# Patient Record
Sex: FEMALE | Race: BLACK OR AFRICAN AMERICAN | NOT HISPANIC OR LATINO | Employment: OTHER | URBAN - METROPOLITAN AREA
[De-identification: names, ages, dates, MRNs, and addresses within clinical notes are randomized per-mention and may not be internally consistent; named-entity substitution may affect disease eponyms.]

---

## 2023-03-08 LAB
ALANINE AMINOTRANSFERASE (SGPT) (U/L) IN SER/PLAS: 20 U/L (ref 7–45)
ALBUMIN (G/DL) IN SER/PLAS: 4 G/DL (ref 3.4–5)
ALKALINE PHOSPHATASE (U/L) IN SER/PLAS: 115 U/L (ref 33–110)
ANION GAP IN SER/PLAS: 14 MMOL/L (ref 10–20)
ASPARTATE AMINOTRANSFERASE (SGOT) (U/L) IN SER/PLAS: 21 U/L (ref 9–39)
BILIRUBIN TOTAL (MG/DL) IN SER/PLAS: 0.4 MG/DL (ref 0–1.2)
CALCIUM (MG/DL) IN SER/PLAS: 9.5 MG/DL (ref 8.6–10.6)
CARBON DIOXIDE, TOTAL (MMOL/L) IN SER/PLAS: 32 MMOL/L (ref 21–32)
CHLORIDE (MMOL/L) IN SER/PLAS: 101 MMOL/L (ref 98–107)
CREATININE (MG/DL) IN SER/PLAS: 0.79 MG/DL (ref 0.5–1.05)
GFR FEMALE: 87 ML/MIN/1.73M2
GLUCOSE (MG/DL) IN SER/PLAS: 98 MG/DL (ref 74–99)
POTASSIUM (MMOL/L) IN SER/PLAS: 3.9 MMOL/L (ref 3.5–5.3)
PROTEIN TOTAL: 7.1 G/DL (ref 6.4–8.2)
SODIUM (MMOL/L) IN SER/PLAS: 143 MMOL/L (ref 136–145)
THYROTROPIN (MIU/L) IN SER/PLAS BY DETECTION LIMIT <= 0.05 MIU/L: 1.39 MIU/L (ref 0.44–3.98)
UREA NITROGEN (MG/DL) IN SER/PLAS: 12 MG/DL (ref 6–23)

## 2023-03-16 DIAGNOSIS — E11.9 TYPE 2 DIABETES MELLITUS WITHOUT COMPLICATION, WITHOUT LONG-TERM CURRENT USE OF INSULIN (MULTI): Primary | ICD-10-CM

## 2023-03-16 RX ORDER — DULAGLUTIDE 0.75 MG/.5ML
INJECTION, SOLUTION SUBCUTANEOUS
Qty: 6 ML | Refills: 2 | Status: SHIPPED | OUTPATIENT
Start: 2023-03-16 | End: 2023-12-14 | Stop reason: SDUPTHER

## 2023-03-20 LAB
CHOLESTEROL (MG/DL) IN SER/PLAS: 145 MG/DL (ref 0–199)
CHOLESTEROL IN HDL (MG/DL) IN SER/PLAS: 44.2 MG/DL
CHOLESTEROL/HDL RATIO: 3.3
LDL: 90 MG/DL (ref 0–99)
TRIGLYCERIDE (MG/DL) IN SER/PLAS: 55 MG/DL (ref 0–149)
VLDL: 11 MG/DL (ref 0–40)

## 2023-08-28 ENCOUNTER — TELEMEDICINE (OUTPATIENT)
Dept: PRIMARY CARE | Facility: CLINIC | Age: 58
End: 2023-08-28
Payer: MEDICARE

## 2023-08-28 VITALS — BODY MASS INDEX: 39.99 KG/M2 | WEIGHT: 270 LBS | HEIGHT: 69 IN

## 2023-08-28 DIAGNOSIS — R73.09 OTHER ABNORMAL GLUCOSE: ICD-10-CM

## 2023-08-28 DIAGNOSIS — M25.561 PAIN IN BOTH KNEES, UNSPECIFIED CHRONICITY: ICD-10-CM

## 2023-08-28 DIAGNOSIS — E03.9 ACQUIRED HYPOTHYROIDISM: Primary | ICD-10-CM

## 2023-08-28 DIAGNOSIS — M25.562 PAIN IN BOTH KNEES, UNSPECIFIED CHRONICITY: ICD-10-CM

## 2023-08-28 PROCEDURE — 99443 PR PHYS/QHP TELEPHONE EVALUATION 21-30 MIN: CPT | Performed by: FAMILY MEDICINE

## 2023-08-28 RX ORDER — ALBUTEROL SULFATE 90 UG/1
1 AEROSOL, METERED RESPIRATORY (INHALATION) EVERY 6 HOURS PRN
COMMUNITY
Start: 2023-05-04 | End: 2023-11-06

## 2023-08-28 RX ORDER — DICLOFENAC SODIUM 10 MG/G
4 GEL TOPICAL 4 TIMES DAILY
Qty: 100 G | Refills: 2 | Status: SHIPPED | OUTPATIENT
Start: 2023-08-28

## 2023-08-28 RX ORDER — LEVOTHYROXINE SODIUM 100 UG/1
100 TABLET ORAL
COMMUNITY
Start: 2021-10-25 | End: 2023-11-06 | Stop reason: DRUGHIGH

## 2023-08-28 RX ORDER — HYDROCHLOROTHIAZIDE 25 MG/1
25 TABLET ORAL DAILY
COMMUNITY
Start: 2021-07-22 | End: 2024-02-06 | Stop reason: SDUPTHER

## 2023-08-28 RX ORDER — ATORVASTATIN CALCIUM 20 MG/1
20 TABLET, FILM COATED ORAL DAILY
COMMUNITY
Start: 2022-06-27 | End: 2023-09-17

## 2023-08-28 ASSESSMENT — PAIN SCALES - GENERAL: PAINLEVEL: 0-NO PAIN

## 2023-08-28 NOTE — PROGRESS NOTES
"  Subjective   Chief complaint: Bisi Schwarz is a 57 y.o. female who presents for Follow-up.    HPI:  Phone visit only for follow-up.  Patient reports that she has been having bilateral knee pain, on and off, associated with some swelling.  Does have a history of arthritis in her back, pain is not interfering with walking.  Has been compliant with her medications and does not currently need refills.  Medication reviewed and reconciled with patient.  She also was here to review recent blood work.        Objective   Ht 1.753 m (5' 9\")   Wt 122 kg (270 lb)   BMI 39.87 kg/m²       PHYSICAL EXAMINATION:  GENERAL: Alert  HEENT: unable to assess  NECK: unable to assess  CHEST: Non labored breathing  EXTREMITIES: unable to assess  NEUROLOGIC: Deferred       I have reviewed and reconciled the medication list with the patient today.   Current Outpatient Medications:     atorvastatin (Lipitor) 20 mg tablet, Take 1 tablet (20 mg) by mouth once daily., Disp: , Rfl:     hydroCHLOROthiazide (HYDRODiuril) 25 mg tablet, Take 1 tablet (25 mg) by mouth once daily., Disp: , Rfl:     levothyroxine (Synthroid, Levoxyl) 100 mcg tablet, Take 1 tablet (100 mcg) by mouth once daily in the morning. Take before meals., Disp: , Rfl:     Ventolin HFA 90 mcg/actuation inhaler, Inhale 1 puff every 6 hours if needed., Disp: , Rfl:     amitriptyline (Elavil) 25 mg tablet, , Disp: , Rfl:     diclofenac sodium (Voltaren) 1 % gel gel, Apply 1 Application topically 4 times a day., Disp: 100 g, Rfl: 2    Trulicity 0.75 mg/0.5 mL pen injector, INJECT ONCE WEEKLY SUBCUTANEOUSLY, Disp: 6 mL, Rfl: 2     Imaging:  No results found.     Labs reviewed:    Lab Results   Component Value Date    WBC 6.4 12/05/2021    HGB 13.3 12/05/2021    HCT 42.1 12/05/2021     12/05/2021    CHOL 145 03/20/2023    TRIG 55 03/20/2023    HDL 44.2 03/20/2023    ALT 20 03/08/2023    AST 21 03/08/2023     03/08/2023    K 3.9 03/08/2023     03/08/2023    " CREATININE 0.79 03/08/2023    BUN 12 03/08/2023    CO2 32 03/08/2023    TSH 1.39 03/08/2023    INR 1.0 12/05/2021    HGBA1C 5.7 (A) 05/25/2022         Assessment/Plan   Diagnoses and all orders for this visit:  Acquired hypothyroidism  -     Hemoglobin A1C; Future  -     Tsh With Reflex To Free T4 If Abnormal; Future  Other abnormal glucose  -     Hemoglobin A1C; Future  Pain in both knees, unspecified chronicity  -     diclofenac sodium (Voltaren) 1 % gel gel; Apply 1 Application topically 4 times a day.   Diagnosis and Management discussed with the patient.  Patient agreeable with plan.  Patient advised to Return to clinic with new or unresolved symptoms.  Doc Vasquez MD    This note was partially generated using the Dragon Voice Recognition System and there may be some incorrect words or wording, spelling and /or spelling errors, or punctuation errors that were not corrected prior to committing the note to the medical record.

## 2023-09-13 DIAGNOSIS — R73.09 OTHER ABNORMAL GLUCOSE: Primary | ICD-10-CM

## 2023-09-17 RX ORDER — ATORVASTATIN CALCIUM 20 MG/1
TABLET, FILM COATED ORAL
Qty: 90 TABLET | Refills: 6 | Status: SHIPPED | OUTPATIENT
Start: 2023-09-17 | End: 2024-02-06 | Stop reason: SDUPTHER

## 2023-11-03 ENCOUNTER — LAB (OUTPATIENT)
Dept: LAB | Facility: LAB | Age: 58
End: 2023-11-03
Payer: MEDICARE

## 2023-11-03 DIAGNOSIS — E03.9 ACQUIRED HYPOTHYROIDISM: ICD-10-CM

## 2023-11-03 LAB
T4 FREE SERPL-MCNC: 0.99 NG/DL (ref 0.78–1.48)
TSH SERPL-ACNC: 6.46 MIU/L (ref 0.44–3.98)

## 2023-11-03 PROCEDURE — 84443 ASSAY THYROID STIM HORMONE: CPT

## 2023-11-03 PROCEDURE — 84439 ASSAY OF FREE THYROXINE: CPT

## 2023-11-03 PROCEDURE — 36415 COLL VENOUS BLD VENIPUNCTURE: CPT

## 2023-11-06 ENCOUNTER — OFFICE VISIT (OUTPATIENT)
Dept: PRIMARY CARE | Facility: CLINIC | Age: 58
End: 2023-11-06
Payer: MEDICARE

## 2023-11-06 VITALS
HEIGHT: 69 IN | BODY MASS INDEX: 41.83 KG/M2 | TEMPERATURE: 96.9 F | HEART RATE: 78 BPM | OXYGEN SATURATION: 96 % | DIASTOLIC BLOOD PRESSURE: 74 MMHG | WEIGHT: 282.4 LBS | SYSTOLIC BLOOD PRESSURE: 114 MMHG

## 2023-11-06 DIAGNOSIS — E03.8 OTHER SPECIFIED HYPOTHYROIDISM: Primary | ICD-10-CM

## 2023-11-06 DIAGNOSIS — E66.01 OBESITY, MORBID (MULTI): ICD-10-CM

## 2023-11-06 DIAGNOSIS — J45.20 MILD INTERMITTENT ASTHMA WITHOUT COMPLICATION (HHS-HCC): ICD-10-CM

## 2023-11-06 DIAGNOSIS — M54.40 LOW BACK PAIN WITH SCIATICA, SCIATICA LATERALITY UNSPECIFIED, UNSPECIFIED BACK PAIN LATERALITY, UNSPECIFIED CHRONICITY: ICD-10-CM

## 2023-11-06 PROBLEM — H02.88A MEIBOMIAN GLAND DYSFUNCTION (MGD) OF UPPER AND LOWER EYELID OF RIGHT EYE: Status: ACTIVE | Noted: 2023-11-06

## 2023-11-06 PROBLEM — G43.909 MIGRAINE HEADACHE: Status: ACTIVE | Noted: 2023-09-05

## 2023-11-06 PROBLEM — F20.9 SCHIZOPHRENIA, UNSPECIFIED (MULTI): Chronic | Status: ACTIVE | Noted: 2021-06-01

## 2023-11-06 PROBLEM — F31.10 BIPOLAR DISORDER, CURRENT EPISODE MANIC WITHOUT PSYCHOTIC FEATURES (MULTI): Status: ACTIVE | Noted: 2023-11-06

## 2023-11-06 PROBLEM — H10.13 ALLERGIC CONJUNCTIVITIS OF BOTH EYES: Status: ACTIVE | Noted: 2023-11-06

## 2023-11-06 PROBLEM — E03.9 HYPOTHYROIDISM: Status: ACTIVE | Noted: 2023-11-01

## 2023-11-06 PROBLEM — R93.89 ABNORMAL MRI: Status: ACTIVE | Noted: 2023-11-06

## 2023-11-06 PROBLEM — M25.60 MORNING STIFFNESS OF JOINTS: Status: ACTIVE | Noted: 2023-09-05

## 2023-11-06 PROBLEM — E11.9 DIABETES MELLITUS (MULTI): Status: ACTIVE | Noted: 2023-11-06

## 2023-11-06 PROBLEM — R10.2 PELVIC PAIN IN FEMALE: Status: ACTIVE | Noted: 2023-03-23

## 2023-11-06 PROBLEM — E66.812 OBESITY, CLASS II, BMI 35-39.9: Status: ACTIVE | Noted: 2018-12-13

## 2023-11-06 PROBLEM — H02.88B MEIBOMIAN GLAND DYSFUNCTION (MGD) OF UPPER AND LOWER EYELID OF LEFT EYE: Status: ACTIVE | Noted: 2023-11-06

## 2023-11-06 PROBLEM — N60.19 FIBROCYSTIC BREAST CHANGES: Status: ACTIVE | Noted: 2023-09-05

## 2023-11-06 PROBLEM — R42 VERTIGO: Status: ACTIVE | Noted: 2023-11-06

## 2023-11-06 PROBLEM — R92.30 DENSE BREASTS: Status: ACTIVE | Noted: 2023-11-06

## 2023-11-06 PROBLEM — R92.2 DENSE BREASTS: Status: ACTIVE | Noted: 2023-11-06

## 2023-11-06 PROBLEM — D24.9 FIBROADENOMA OF BREAST: Status: ACTIVE | Noted: 2023-09-05

## 2023-11-06 PROBLEM — H52.31 ANISOMETROPIA: Status: ACTIVE | Noted: 2023-11-06

## 2023-11-06 PROBLEM — E11.9 TYPE 2 DIABETES MELLITUS WITHOUT COMPLICATIONS (MULTI): Status: ACTIVE | Noted: 2023-11-06

## 2023-11-06 PROBLEM — R73.03 PREDIABETES: Status: ACTIVE | Noted: 2023-08-24

## 2023-11-06 PROBLEM — R92.8 ABNORMAL ULTRASOUND OF BREAST: Status: ACTIVE | Noted: 2023-11-06

## 2023-11-06 PROBLEM — J90 PLEURAL EFFUSION, BILATERAL: Status: ACTIVE | Noted: 2023-11-06

## 2023-11-06 PROBLEM — I10 HYPERTENSION: Status: ACTIVE | Noted: 2023-11-01

## 2023-11-06 PROBLEM — D12.6 TUBULAR ADENOMA OF COLON: Status: ACTIVE | Noted: 2023-11-06

## 2023-11-06 PROBLEM — H52.13 MYOPIA WITH PRESBYOPIA OF BOTH EYES: Status: ACTIVE | Noted: 2023-11-06

## 2023-11-06 PROBLEM — N63.0 MASS OF BREAST: Status: ACTIVE | Noted: 2023-09-05

## 2023-11-06 PROBLEM — R29.6 FREQUENT FALLS: Status: ACTIVE | Noted: 2023-11-06

## 2023-11-06 PROBLEM — D32.9 MENINGIOMA (MULTI): Status: ACTIVE | Noted: 2023-11-06

## 2023-11-06 PROBLEM — M54.50 LOW BACK PAIN: Status: ACTIVE | Noted: 2023-11-06

## 2023-11-06 PROBLEM — R16.1 SPLENIC MASS: Status: ACTIVE | Noted: 2023-11-06

## 2023-11-06 PROBLEM — E78.5 HYPERLIPIDEMIA: Status: ACTIVE | Noted: 2023-11-01

## 2023-11-06 PROBLEM — H52.4 MYOPIA WITH PRESBYOPIA OF BOTH EYES: Status: ACTIVE | Noted: 2023-11-06

## 2023-11-06 PROBLEM — R92.8 ABNORMAL MAMMOGRAM: Status: ACTIVE | Noted: 2023-11-06

## 2023-11-06 PROBLEM — H52.03 HYPERMETROPIA OF BOTH EYES: Status: ACTIVE | Noted: 2023-11-06

## 2023-11-06 PROBLEM — E66.9 OBESITY, CLASS II, BMI 35-39.9: Status: ACTIVE | Noted: 2018-12-13

## 2023-11-06 PROBLEM — J45.909 ASTHMA (HHS-HCC): Status: ACTIVE | Noted: 2023-11-06

## 2023-11-06 PROCEDURE — 3074F SYST BP LT 130 MM HG: CPT | Performed by: FAMILY MEDICINE

## 2023-11-06 PROCEDURE — 99214 OFFICE O/P EST MOD 30 MIN: CPT | Performed by: FAMILY MEDICINE

## 2023-11-06 PROCEDURE — 3078F DIAST BP <80 MM HG: CPT | Performed by: FAMILY MEDICINE

## 2023-11-06 PROCEDURE — 1036F TOBACCO NON-USER: CPT | Performed by: FAMILY MEDICINE

## 2023-11-06 RX ORDER — LEVOTHYROXINE SODIUM 112 UG/1
112 TABLET ORAL DAILY
Qty: 30 TABLET | Refills: 11 | Status: SHIPPED | OUTPATIENT
Start: 2023-11-06 | End: 2024-02-06 | Stop reason: SDUPTHER

## 2023-11-06 RX ORDER — FERROUS SULFATE, DRIED 160(50) MG
1 TABLET, EXTENDED RELEASE ORAL DAILY
Qty: 30 TABLET | Refills: 3 | Status: SHIPPED | OUTPATIENT
Start: 2023-11-06 | End: 2024-03-29 | Stop reason: SDUPTHER

## 2023-11-06 RX ORDER — ALBUTEROL SULFATE 90 UG/1
2 AEROSOL, METERED RESPIRATORY (INHALATION) EVERY 4 HOURS PRN
Qty: 6.7 G | Refills: 5 | Status: SHIPPED | OUTPATIENT
Start: 2023-11-06 | End: 2024-11-05

## 2023-11-06 ASSESSMENT — ENCOUNTER SYMPTOMS: DEPRESSION: 0

## 2023-11-06 ASSESSMENT — PAIN SCALES - GENERAL: PAINLEVEL: 6

## 2023-11-06 NOTE — PROGRESS NOTES
Subjective   Patient ID: Bisi Schwarz is a 57 y.o. female with PMH of HTN, HLD, T2DM, and hypothyroidism who presents for follow up visit.    HPI  Reports no acute concerns since last visit in August 2023. Had complaints of knee pain since at telehealth visit, but no issues with knee pain today. Patient does complain of fatigue, hip/back pain, and weight gain, although these are chronic issues.    Fatigue  -reports fatigue has been going on for past 2-3 years.  On and off, resolves throughout the day.    Hip/back pain  -been going on for 10 years now  -has worsened over the past 2-3 years  -hx of arthritis, taking nothing for it  Affects her ability to work out    Weight gain  -gained 12 pounds this year  -on trulicity for weight loss and diabetes  -diet: reported good diet, lots of fruits and vegetables  -exercise: none since hip/back pain worsened 2-3 years ago    Health maintenance  -mammogram in 2023: no abnormal findings  -pap: patient had hysterectomy  -colonoscopy in 2022: no abnormal findings (come back in 3 years) -> due in 2025  -received flu shot and covid shot this fall  -received shingrix in 2023  -no need for pneumococcal at this time    PMH: HLD, HTN, T2DM, hypothyroidism     Current Outpatient Medications   Medication Instructions         atorvastatin (Lipitor) 20 mg tablet TAKE 1 TABLET BY MOUTH EVERY DAY EVERY DAY    diclofenac sodium (Voltaren) 1 % gel gel 1 Application, Topical, 4 times daily    hydroCHLOROthiazide (HYDRODIURIL) 25 mg, oral, Daily    levothyroxine (SYNTHROID, LEVOXYL) 100 mcg, oral, Daily before breakfast    Trulicity 0.75 mg/0.5 mL pen injector INJECT ONCE WEEKLY SUBCUTANEOUSLY    Ventolin HFA 90 mcg/actuation inhaler 1 puff, inhalation, Every 6 hours PRN      Past Surgical History:   Procedure Laterality Date    BREAST BIOPSY  03/18/2014    Biopsy Breast Open    TOTAL ABDOMINAL HYSTERECTOMY  03/18/2014    Total Abdominal Hysterectomy    VARICOSE VEIN SURGERY  09/29/2014     "Varicose Vein Ligation      Allergies   Allergen Reactions    Penicillins Dizziness and Hives     States \"falls out\"    Other reaction(s): Dizziness, Mental Status Change   States \"falls out\"    Pork Derived (Porcine) Headache    Sulfa (Sulfonamide Antibiotics) Diarrhea and Rash      Social History     Tobacco Use    Smoking status: Never    Smokeless tobacco: Never   Substance Use Topics    Alcohol use: Never    Drug use: Never      Family hx:  -Diabetes in father and both grandparents  -HTN and heart disease - father and both grandparents  -Cancer - unsure what type    Review of Systems  Denies fever, chills, cough, shortness of breath  Endorses heat intolerance, night sweats, fatigue, heart palpitations, tremor  Reports snoring    Objective   Vitals: /74 (BP Location: Right arm, Patient Position: Sitting)   Pulse 78   Temp 36.1 °C (96.9 °F) (Temporal)   Ht 1.753 m (5' 9\")   Wt 128 kg (282 lb 6.4 oz)   SpO2 96%   BMI 41.70 kg/m²      Physical Exam  GEN: fatigued and tired appearing, in no acute distress  HEAD: NCAT  EYES: sclera clear and anicteric  CARDIO: RRR, no M/R/G appreciated  RESP: CTAB, no increased WOB  ABD: soft, nontender, no organomegaly  EXT: trace nonpitting edema in bilateral ankles  SKIN: no rashes or lesions  NEURO: cranial nerves II-XII grossly intact      Assessment/Plan   Bisi Schwarz is a 57 y.o. female with PMH of HTN, HLD, T2DM, and hypothyroidism who presents for follow up visit.    #Fatigue  -reports fatigue has been going on for past 2-3 years  -given BMI and reported snoring, could consider YVONNE as possible cause of fatigue, although blood pressure is normotensive (114/74)  -patient has hypothyroidism and last TSH was 6.46 on 11/3/2023  -given elevated TSH, most likely cause of fatigue is inadequate levothyroxine dose -> increase levothyroxine to 112 mcg  -can consider sleep study in future to evaluate for YVONNE if fatigue continues    Hip/back pain  -been going on for 10 " "years now  -has worsened over the past 2-3 years  -hx of arthritis  -referred to physical therapy for management of pain    Weight gain  -gained 12 pounds this year  -on trulicity for weight loss and diabetes  -diet: reported good diet, lots of fruits and vegetables  -exercise: none since hip/back pain worsened 2-3 years ago  -encouraged exercise and referred to physical therapy    Health maintenance  -mammogram in 2023: no abnormal findings -> due in 2025  -pap: patient had hysterectomy  -colonoscopy in 2022: no abnormal findings (come back in 3 years because \"bowel preparation was suboptimal and for surveillance) -> due in 2025  -received flu shot and covid shot this fall  -received shingrix in 2023  -no need for pneumococcal at this time        RTC in 6 months, or earlier as needed.  Attending Supervision: seen and discussed with attending physician (sergio listed on this note).      Wiliam Mills, MS3      I, or a resident under my supervision, was present with the medical student who participated in the documentation of this note.  I have personally seen and examined the patient and performed the medical decision-making components. I have reviewed the medical student documentation and/or resident documentation and verified the findings in the note as written with additions or exceptions as stated in the body of the note.  Diagnoses and all orders for this visit:  Other specified hypothyroidism  -     levothyroxine (Synthroid, Levoxyl) 112 mcg tablet; Take 1 tablet (112 mcg) by mouth once daily.  -     calcium carbonate-vitamin D3 (Oyster Shell Calcium-Vit D3) 500 mg-5 mcg (200 unit) tablet; Take 1 tablet by mouth once daily.  Mild intermittent asthma without complication  -     albuterol (Proventil HFA) 90 mcg/actuation inhaler; Inhale 2 puffs every 4 hours if needed for wheezing or shortness of breath.  Low back pain with sciatica, sciatica laterality unspecified, unspecified back pain laterality, " unspecified chronicity  -     Referral to Physical Therapy; Future    Doc Vasquez MD

## 2023-11-27 ENCOUNTER — EVALUATION (OUTPATIENT)
Dept: PHYSICAL THERAPY | Facility: CLINIC | Age: 58
End: 2023-11-27
Payer: MEDICARE

## 2023-11-27 DIAGNOSIS — M54.40 LOW BACK PAIN WITH SCIATICA, SCIATICA LATERALITY UNSPECIFIED, UNSPECIFIED BACK PAIN LATERALITY, UNSPECIFIED CHRONICITY: Primary | ICD-10-CM

## 2023-11-27 PROCEDURE — 97161 PT EVAL LOW COMPLEX 20 MIN: CPT | Mod: GP

## 2023-11-27 PROCEDURE — 97110 THERAPEUTIC EXERCISES: CPT | Mod: GP

## 2023-11-27 ASSESSMENT — ENCOUNTER SYMPTOMS
OCCASIONAL FEELINGS OF UNSTEADINESS: 0
DEPRESSION: 0

## 2023-11-27 NOTE — PROGRESS NOTES
Physical Therapy    Physical Therapy Evaluation and Treatment      Patient Name: Bisi Schwarz  MRN: [id number]  Today's Date: 11/27/2023  Time Calculation  Start Time: 1310  Stop Time: 1345  Time Calculation (min): 35 min  Visit: 1    Assessment:  PT Assessment  Assessment Comment: Patient presents with signs and symptoms consistent with the medical diagnosis of sciatica. She will benefit from medically necessary skilled physical therapy interventions in order to decrease pain and improve function with daily activities.     Plan:  OP PT Plan  Treatment/Interventions: Cryotherapy, Dry needling, Education/ Instruction, Electrical stimulation, Hot pack, Mechanical traction, Neuromuscular re-education, Therapeutic activities, Therapeutic exercises  PT Plan: Skilled PT  PT Frequency: 1 time per week  Duration: 8 weeks  Certification Period Start Date: 11/27/23  Certification Period End Date: 02/25/24  Rehab Potential: Excellent  Plan of Care Agreement: Patient    Current Problem:   1. Low back pain with sciatica, sciatica laterality unspecified, unspecified back pain laterality, unspecified chronicity  Referral to Physical Therapy    Follow Up In Physical Therapy          Subjective    General:  General  Reason for Referral: low back pain  Referred By: Doc Vasquez MD  Preferred Learning Style: auditory  General Comment: Patient is a 56 y/o female who was referred to outpatient physical therapy due to bilateral sciatica. She notes pain starts in her low back and radiates to bilateral hips. Patient reports this has been going on for about 5 years. The patient was walking one day when she felt the symptoms come on. She report no trauma or injury associated with the onset of pain. She rates her pain today at a 3/10. The patient has been walking for exercise for the past month. She report taking ibuprofen and Tylenol as needed. The patient reports that if she walk for more than an hour the knees will swell  "up.  Precautions:   None   Pain:   3/10  Prior Level of Function:   Independent with all ADLs.    Objective   Cognition:     General Assessments:  Posture Comment: Patient presents with slightly forward flexed posture while seated.    Range of Motion Comments: Lumbar ROM:  Flexion 75%  Extension 25%  Right Rotation 75%  Left Rotation 75%  Right Side Bend 50%  Left Side Bend 50%    Strength Comments: LE strength (R/L):  Hip flexion 4/5, 4/5  Hip extension 4/5, 4/5  Hip abduction 4/5, 4/5  Knee flexion 5/5, 5/5  Knee extension 5/5, 5/5  Ankle plantarflexion 5/5, 5/5  Ankle dorsiflexion 5/5, 5/5  Core: 3/5    Palpation Comment: The patient's lower lumbar paraspinal musculature is tender to palpation.   Special Tests Comment: slump test positive bilaterally, SLR positive left, negative right  Functional Assessments:  Gait Comment: Patient ambulates with a normalized gait pattern.    Outcome Measures:  MEENA: 52% Disability     Treatments:  Therapeutic Exercise  Therapeutic Exercise Performed: Yes  Therapeutic Exercise Activity 1: LTR 10 x 5\"  Therapeutic Exercise Activity 2: open book 10 x 10\"  Therapeutic Exercise Activity 3: piriformis stretch 3 x 30\"  Therapeutic Exercise Activity 4: supine PPT 20 x 5\"  Therapeutic Exercise Activity 5: bridge 2 x 10 x 3\"    Goals:  Active       PT Problem       Patient will report compliance with her home exercise program.        Start:  11/27/23    Expected End:  02/25/24            Patient will report improvement via the MEENA to show improvements in activity tolerance.        Start:  11/27/23    Expected End:  02/25/24            Patient will demonstrate improvements in core and hip strength to 5/5 to improve lumbopelvic stability with daily activities.        Start:  11/27/23    Expected End:  02/25/24                      "

## 2023-12-05 ENCOUNTER — TREATMENT (OUTPATIENT)
Dept: PHYSICAL THERAPY | Facility: CLINIC | Age: 58
End: 2023-12-05
Payer: MEDICARE

## 2023-12-05 DIAGNOSIS — M54.40 ACUTE RIGHT-SIDED LOW BACK PAIN WITH SCIATICA, SCIATICA LATERALITY UNSPECIFIED: Primary | ICD-10-CM

## 2023-12-05 DIAGNOSIS — M54.40 LOW BACK PAIN WITH SCIATICA, SCIATICA LATERALITY UNSPECIFIED, UNSPECIFIED BACK PAIN LATERALITY, UNSPECIFIED CHRONICITY: ICD-10-CM

## 2023-12-05 PROCEDURE — 97110 THERAPEUTIC EXERCISES: CPT | Mod: GP,CQ | Performed by: PHYSICAL THERAPY ASSISTANT

## 2023-12-05 NOTE — PROGRESS NOTES
"Physical Therapy    Physical Therapy Treatment    Patient Name: Bisi Schwarz  MRN: [id number]  Today's Date: 12/5/2023  Time Calculation  Start Time: 1155  Stop Time: 1240  Time Calculation (min): 45 min  Visit 2    Assessment:  Good return demo of HEP except she forgot bridges. Able to perform bridges today w/o c/o pain. Struggles with squats d/t her knees and limited ankle ROM. Squats were better after DF step mobs and lifting form was better.     Plan:  Review lifting . General LE and core strength. ( Tall knee KB) 4 way hip .     Current Problem  1. Acute right-sided low back pain with sciatica, sciatica laterality unspecified        2. Low back pain with sciatica, sciatica laterality unspecified, unspecified back pain laterality, unspecified chronicity  Follow Up In Physical Therapy            Subjective    Compliant w/ HEP. Had some back strain after moving some books while volunteering at the [employer]. Strain resolved after 2 days.     Objective   Lifts with knees extended and back flexed    Treatments:  Therapeutic Exercise  Therapeutic Exercise Performed: Yes  Therapeutic Exercise Activity 1: LTR 10 x 5\"  Therapeutic Exercise Activity 2: open book 10 x 10\"  Therapeutic Exercise Activity 3: piriformis stretch 3 x 30\"  Therapeutic Exercise Activity 4: supine PPT 20 x 5\"  Therapeutic Exercise Activity 5: bridge 2 x 10 x 3\"  Therapeutic Exercise Activity 6: Lifitng mechanics: get close, squat, lift in one motion w/ head up      Goals:  Active       PT Problem       Patient will report compliance with her home exercise program.        Start:  11/27/23    Expected End:  02/25/24            Patient will report improvement via the MEENA to show improvements in activity tolerance.        Start:  11/27/23    Expected End:  02/25/24            Patient will demonstrate improvements in core and hip strength to 5/5 to improve lumbopelvic stability with daily activities.        Start:  11/27/23    Expected End:  02/25/24 "

## 2023-12-14 ENCOUNTER — TREATMENT (OUTPATIENT)
Dept: PHYSICAL THERAPY | Facility: CLINIC | Age: 58
End: 2023-12-14
Payer: MEDICARE

## 2023-12-14 DIAGNOSIS — M54.40 ACUTE RIGHT-SIDED LOW BACK PAIN WITH SCIATICA, SCIATICA LATERALITY UNSPECIFIED: Primary | ICD-10-CM

## 2023-12-14 DIAGNOSIS — E11.9 TYPE 2 DIABETES MELLITUS WITHOUT COMPLICATION, WITHOUT LONG-TERM CURRENT USE OF INSULIN (MULTI): ICD-10-CM

## 2023-12-14 PROCEDURE — 97110 THERAPEUTIC EXERCISES: CPT | Mod: GP,CQ | Performed by: PHYSICAL THERAPY ASSISTANT

## 2023-12-14 RX ORDER — DULAGLUTIDE 0.75 MG/.5ML
INJECTION, SOLUTION SUBCUTANEOUS
Qty: 6 ML | Refills: 2 | Status: SHIPPED | OUTPATIENT
Start: 2023-12-14 | End: 2024-02-06 | Stop reason: SDUPTHER

## 2023-12-14 NOTE — PROGRESS NOTES
"Physical Therapy    Physical Therapy Treatment    Patient Name: Bisi Schwarz  MRN: [id number]  Today's Date: 12/14/2023  Time Calculation  Start Time: 1045  Stop Time: 1135  Time Calculation (min): 50 min  Visit 3    Assessment:  Did well with seated versions of trunk rotation and standing trunk extension for easy application after strenuous activity .     Plan:   Farmer's carry .    Current Problem  1. Acute right-sided low back pain with sciatica, sciatica laterality unspecified              Subjective    C/o T/S \"spasms \" after house work yesterday. Did not try HEP ex or stretches to relieve spasms.     Objective   Sits with slightly slouched posture ; corrects with cues     Treatments:  Therapeutic Exercise  Therapeutic Exercise Performed: Yes  Therapeutic Exercise Activity 1: LTR 10 x 5\"  Therapeutic Exercise Activity 2: open book 10 x 10\"  Therapeutic Exercise Activity 3: piriformis stretch 3 x 30\"  Therapeutic Exercise Activity 4: supine PPT 20 x 5\"  Therapeutic Exercise Activity 5: bridge 2 x 10 x 3\"  Therapeutic Exercise Activity 6: Leg press #80 x 20 #40 heel raises x 20  Therapeutic Exercise Activity 7: seated trunk rotation x 5 reps R/L  Therapeutic Exercise Activity 8: standing trunk extension x 10 reps  Therapeutic Exercise Activity 9: Seated piriformis stretch 30 sec x 2 L/R  Therapeutic Exercise Activity 10: Bike x 5 min      Goals:  Active       PT Problem       Patient will report compliance with her home exercise program.        Start:  11/27/23    Expected End:  02/25/24            Patient will report improvement via the MEENA to show improvements in activity tolerance.        Start:  11/27/23    Expected End:  02/25/24            Patient will demonstrate improvements in core and hip strength to 5/5 to improve lumbopelvic stability with daily activities.        Start:  11/27/23    Expected End:  02/25/24              " 2 = assistive person

## 2023-12-27 ENCOUNTER — TREATMENT (OUTPATIENT)
Dept: PHYSICAL THERAPY | Facility: CLINIC | Age: 58
End: 2023-12-27
Payer: MEDICARE

## 2023-12-27 DIAGNOSIS — M54.40 ACUTE RIGHT-SIDED LOW BACK PAIN WITH SCIATICA, SCIATICA LATERALITY UNSPECIFIED: Primary | ICD-10-CM

## 2023-12-27 PROCEDURE — 97110 THERAPEUTIC EXERCISES: CPT | Mod: GP,CQ | Performed by: PHYSICAL THERAPY ASSISTANT

## 2023-12-27 NOTE — PROGRESS NOTES
"Physical Therapy    Physical Therapy Treatment    Patient Name: Bisi Schwarz  MRN: [id number]  Today's Date: 12/27/2023  Time Calculation  Start Time: 1020  Stop Time: 1110  Time Calculation (min): 50 min  Visit 4    Assessment:  Alternatives to bridging ( sit/stands, wall slide) bothered her L knee, so encouraged her to continue w/ bridges .     Plan:   Farmer's carry , hip to heel stretch, hamstring stretch, 4 way hip w/ band.     Current Problem  1. Acute right-sided low back pain with sciatica, sciatica laterality unspecified              Subjective    Had brief back pain yesterday while shopping .    Objective   More neck extension vs. Trunk extension during back bends     Treatments:  Therapeutic Exercise  Therapeutic Exercise Performed: Yes  Therapeutic Exercise Activity 1: LTR 10 x 5\"  Therapeutic Exercise Activity 2: open book 10 x 10\"  Therapeutic Exercise Activity 3: piriformis stretch 3 x 30\"  Therapeutic Exercise Activity 4: supine PPT 20 x 5\"  Therapeutic Exercise Activity 5: bridge 2 x 10 x 3\"  Therapeutic Exercise Activity 6: Leg press #80 x 20 #40 heel raises x 20  Therapeutic Exercise Activity 7: seated trunk rotation x 5 reps R/L  Therapeutic Exercise Activity 8: standing trunk extension x 10 reps  Therapeutic Exercise Activity 10: Bike x 5 min  Therapeutic Exercise Activity 11: heel riases 2 x 10  Therapeutic Exercise Activity 12: 6\" step ups fwd 2 x 10 R/L      Goals:  Active       PT Problem       Patient will report compliance with her home exercise program.        Start:  11/27/23    Expected End:  02/25/24            Patient will report improvement via the MEENA to show improvements in activity tolerance.        Start:  11/27/23    Expected End:  02/25/24            Patient will demonstrate improvements in core and hip strength to 5/5 to improve lumbopelvic stability with daily activities.        Start:  11/27/23    Expected End:  02/25/24              "

## 2024-01-03 ENCOUNTER — TREATMENT (OUTPATIENT)
Dept: PHYSICAL THERAPY | Facility: CLINIC | Age: 59
End: 2024-01-03
Payer: MEDICARE

## 2024-01-03 DIAGNOSIS — M54.40 ACUTE RIGHT-SIDED LOW BACK PAIN WITH SCIATICA, SCIATICA LATERALITY UNSPECIFIED: Primary | ICD-10-CM

## 2024-01-03 PROCEDURE — 97110 THERAPEUTIC EXERCISES: CPT | Mod: GP,CQ | Performed by: PHYSICAL THERAPY ASSISTANT

## 2024-01-03 NOTE — PROGRESS NOTES
Physical Therapy    Physical Therapy Treatment    Patient Name: Bisi Schwarz  MRN: [id number]  Today's Date: 1/3/2024  [id number]     Visit 4    Assessment:  Challenged w/ farmer's carry w/ cues to engage core avoiding trunk side bend.     Plan:  Hip to heel stretch, hamstring stretch,     Current Problem    Acute right-sided low back pain with sciatica, sciatica laterality unspecified   Subjective    Doing well , intermittent LBP w/o radicular symptoms . C/o R knee pain .     Objective   Amb w/o antalgia     Treatments:  Therapeutic Exercise  Therapeutic Exercise Performed: Yes  Therapeutic Exercise Activity 1: back extension #60 3 x 10  Therapeutic Exercise Activity 2: HSC #33 3 x 10  Therapeutic Exercise Activity 3: KE #11 3 x 10  Therapeutic Exercise Activity 4: 4 way hip #10 x 10 R/L  Therapeutic Exercise Activity 5: bike x 5 min  Therapeutic Exercise Activity 6: Leg press #80 x 20 #40 heel raises x 20  Therapeutic Exercise Activity 7: Farmer's carry #7.5 KB x 4 laps in clinic      Goals:  Active       PT Problem       Patient will report compliance with her home exercise program.        Start:  11/27/23    Expected End:  02/25/24            Patient will report improvement via the MEENA to show improvements in activity tolerance.        Start:  11/27/23    Expected End:  02/25/24            Patient will demonstrate improvements in core and hip strength to 5/5 to improve lumbopelvic stability with daily activities.        Start:  11/27/23    Expected End:  02/25/24

## 2024-01-05 ENCOUNTER — APPOINTMENT (OUTPATIENT)
Dept: OPHTHALMOLOGY | Facility: CLINIC | Age: 59
End: 2024-01-05
Payer: MEDICARE

## 2024-01-17 ENCOUNTER — APPOINTMENT (OUTPATIENT)
Dept: PHYSICAL THERAPY | Facility: CLINIC | Age: 59
End: 2024-01-17
Payer: MEDICARE

## 2024-01-29 ENCOUNTER — DOCUMENTATION (OUTPATIENT)
Dept: PHYSICAL THERAPY | Facility: CLINIC | Age: 59
End: 2024-01-29
Payer: MEDICARE

## 2024-01-29 ENCOUNTER — APPOINTMENT (OUTPATIENT)
Dept: PHYSICAL THERAPY | Facility: CLINIC | Age: 59
End: 2024-01-29
Payer: MEDICARE

## 2024-01-29 NOTE — PROGRESS NOTES
Physical Therapy                 Therapy Communication Note    Patient Name: Bisi Schwarz  MRN: [id number]  Today's Date: 1/29/2024     Discipline: Physical Therapy    Missed Visit Reason:      Missed Time: Cancel    Comment:

## 2024-01-31 ENCOUNTER — APPOINTMENT (OUTPATIENT)
Dept: NEUROLOGY | Facility: CLINIC | Age: 59
End: 2024-01-31
Payer: MEDICARE

## 2024-02-02 ENCOUNTER — APPOINTMENT (OUTPATIENT)
Dept: PRIMARY CARE | Facility: CLINIC | Age: 59
End: 2024-02-02
Payer: MEDICARE

## 2024-02-06 ENCOUNTER — OFFICE VISIT (OUTPATIENT)
Dept: PRIMARY CARE | Facility: CLINIC | Age: 59
End: 2024-02-06
Payer: MEDICARE

## 2024-02-06 VITALS
HEART RATE: 87 BPM | OXYGEN SATURATION: 98 % | HEIGHT: 69 IN | DIASTOLIC BLOOD PRESSURE: 78 MMHG | TEMPERATURE: 97.1 F | SYSTOLIC BLOOD PRESSURE: 129 MMHG | BODY MASS INDEX: 40.69 KG/M2 | WEIGHT: 274.7 LBS

## 2024-02-06 DIAGNOSIS — E03.8 OTHER SPECIFIED HYPOTHYROIDISM: ICD-10-CM

## 2024-02-06 DIAGNOSIS — R06.01 ORTHOPNEA: ICD-10-CM

## 2024-02-06 DIAGNOSIS — R73.09 OTHER ABNORMAL GLUCOSE: ICD-10-CM

## 2024-02-06 DIAGNOSIS — R60.1 GENERALIZED EDEMA: ICD-10-CM

## 2024-02-06 DIAGNOSIS — I10 PRIMARY HYPERTENSION: Primary | ICD-10-CM

## 2024-02-06 DIAGNOSIS — N18.2 CKD (CHRONIC KIDNEY DISEASE) STAGE 2, GFR 60-89 ML/MIN: ICD-10-CM

## 2024-02-06 DIAGNOSIS — E11.9 TYPE 2 DIABETES MELLITUS WITHOUT COMPLICATION, WITHOUT LONG-TERM CURRENT USE OF INSULIN (MULTI): ICD-10-CM

## 2024-02-06 PROCEDURE — 1036F TOBACCO NON-USER: CPT | Performed by: STUDENT IN AN ORGANIZED HEALTH CARE EDUCATION/TRAINING PROGRAM

## 2024-02-06 PROCEDURE — 3074F SYST BP LT 130 MM HG: CPT | Performed by: STUDENT IN AN ORGANIZED HEALTH CARE EDUCATION/TRAINING PROGRAM

## 2024-02-06 PROCEDURE — 3078F DIAST BP <80 MM HG: CPT | Performed by: STUDENT IN AN ORGANIZED HEALTH CARE EDUCATION/TRAINING PROGRAM

## 2024-02-06 PROCEDURE — 99214 OFFICE O/P EST MOD 30 MIN: CPT | Performed by: STUDENT IN AN ORGANIZED HEALTH CARE EDUCATION/TRAINING PROGRAM

## 2024-02-06 RX ORDER — LEVOTHYROXINE SODIUM 112 UG/1
112 TABLET ORAL DAILY
Qty: 30 TABLET | Refills: 11 | Status: SHIPPED | OUTPATIENT
Start: 2024-02-06 | End: 2025-02-05

## 2024-02-06 RX ORDER — HYDROCHLOROTHIAZIDE 25 MG/1
25 TABLET ORAL DAILY
Qty: 90 TABLET | Refills: 3 | Status: SHIPPED | OUTPATIENT
Start: 2024-02-06

## 2024-02-06 RX ORDER — ATORVASTATIN CALCIUM 20 MG/1
20 TABLET, FILM COATED ORAL DAILY
Qty: 90 TABLET | Refills: 6 | Status: SHIPPED | OUTPATIENT
Start: 2024-02-06

## 2024-02-06 RX ORDER — DULAGLUTIDE 0.75 MG/.5ML
INJECTION, SOLUTION SUBCUTANEOUS
Qty: 6 ML | Refills: 2 | Status: SHIPPED | OUTPATIENT
Start: 2024-02-06

## 2024-02-06 ASSESSMENT — ENCOUNTER SYMPTOMS
COUGH: 1
SHORTNESS OF BREATH: 1

## 2024-02-06 ASSESSMENT — PAIN SCALES - GENERAL: PAINLEVEL: 0-NO PAIN

## 2024-02-06 NOTE — PROGRESS NOTES
"  Subjective   Patient ID: Bisi Schwarz is a 58 y.o. female who presents for Follow-up.    1. Hip pain  Using cane  PT right now, ending soon  Has exercies to do at home  Has been losing weight by conscientiously eating    2. HTN  Jose chi, stretching, PT  Recent move to new apartment  129/78 BP  Taking hydrochlorothiazide  Sleeps on two pillows  At the end of the day, has SOB, orthopnea  Also non-productive cough in the PM, sometimes wakes her from sleep  Due to affect, straightforward answers to questions are not forthcoming    3. Health maintenance  Next mammogram due              Review of Systems   Respiratory:  Positive for cough and shortness of breath.    Cardiovascular:  Positive for leg swelling.   All other systems reviewed and are negative.      Objective   /78 (BP Location: Right arm, Patient Position: Sitting, BP Cuff Size: Adult long)   Pulse 87   Temp 36.2 °C (97.1 °F) (Temporal)   Ht 1.753 m (5' 9\")   Wt 125 kg (274 lb 11.2 oz)   SpO2 98%   BMI 40.57 kg/m²     Physical Exam  Vitals reviewed.   Constitutional:       Appearance: Normal appearance.   HENT:      Head: Normocephalic and atraumatic.      Mouth/Throat:      Mouth: Mucous membranes are moist.      Pharynx: Oropharynx is clear.   Eyes:      Extraocular Movements: Extraocular movements intact.      Conjunctiva/sclera: Conjunctivae normal.      Pupils: Pupils are equal, round, and reactive to light.   Cardiovascular:      Rate and Rhythm: Normal rate and regular rhythm.      Pulses: Normal pulses.      Heart sounds: Normal heart sounds.   Pulmonary:      Effort: Pulmonary effort is normal.      Breath sounds: Normal breath sounds.   Abdominal:      General: Abdomen is flat. Bowel sounds are normal.      Palpations: Abdomen is soft.   Musculoskeletal:         General: Normal range of motion.      Cervical back: Normal range of motion and neck supple.      Right lower le+ Pitting Edema present.      Left lower le+ " Pitting Edema present.   Skin:     General: Skin is warm and dry.      Capillary Refill: Capillary refill takes less than 2 seconds.   Neurological:      General: No focal deficit present.      Mental Status: She is alert.   Psychiatric:         Mood and Affect: Mood normal.         Behavior: Behavior normal.      Comments: Affect is pleasantly flat         Assessment/Plan   Problem List Items Addressed This Visit             ICD-10-CM    Diabetes mellitus (CMS/[facility]) E11.9    Relevant Medications    dulaglutide (Trulicity) 0.75 mg/0.5 mL pen injector    Other Relevant Orders    Comprehensive metabolic panel    Hypertension - Primary I10    Relevant Medications    hydroCHLOROthiazide (HYDRODiuril) 25 mg tablet    Other Relevant Orders    Transthoracic Echo (TTE) Complete    Hypothyroidism E03.9    Relevant Medications    levothyroxine (Synthroid, Levoxyl) 112 mcg tablet    Other Relevant Orders    TSH    T4, free     Other Visit Diagnoses         Codes    Orthopnea     R06.01    Relevant Orders    Transthoracic Echo (TTE) Complete    CKD (chronic kidney disease) stage 2, GFR 60-89 ml/min     N18.2    Relevant Orders    Transthoracic Echo (TTE) Complete    Generalized edema     R60.1    Relevant Medications    hydroCHLOROthiazide (HYDRODiuril) 25 mg tablet    Other Relevant Orders    Transthoracic Echo (TTE) Complete    Comprehensive metabolic panel    CBC    Other abnormal glucose     R73.09    Relevant Medications    atorvastatin (Lipitor) 20 mg tablet    Other Relevant Orders    Comprehensive metabolic panel                   -------------------------------------------------------------------------------------------------------------------    **This note was entered into the electronic medical record using Dragon medical dictation software, and was not edited thereafter. Please excuse any errors of spelling or  cyn.**    -------------------------------------------------------------------------------------------------------------------    OVERALL PLAN:  [ ] med refills  [ ] lab work  [ ] Echo for evaluation of orthopnea, edema, in setting of HTN

## 2024-02-08 ENCOUNTER — APPOINTMENT (OUTPATIENT)
Dept: PHYSICAL THERAPY | Facility: CLINIC | Age: 59
End: 2024-02-08
Payer: MEDICARE

## 2024-02-10 ENCOUNTER — HOSPITAL ENCOUNTER (EMERGENCY)
Facility: HOSPITAL | Age: 59
Discharge: HOME | End: 2024-02-11
Attending: STUDENT IN AN ORGANIZED HEALTH CARE EDUCATION/TRAINING PROGRAM
Payer: MEDICARE

## 2024-02-10 ENCOUNTER — APPOINTMENT (OUTPATIENT)
Dept: RADIOLOGY | Facility: HOSPITAL | Age: 59
End: 2024-02-10
Payer: MEDICARE

## 2024-02-10 ENCOUNTER — APPOINTMENT (OUTPATIENT)
Dept: CARDIOLOGY | Facility: HOSPITAL | Age: 59
End: 2024-02-10
Payer: MEDICARE

## 2024-02-10 VITALS
BODY MASS INDEX: 40.58 KG/M2 | HEIGHT: 69 IN | OXYGEN SATURATION: 96 % | TEMPERATURE: 98.1 F | SYSTOLIC BLOOD PRESSURE: 130 MMHG | RESPIRATION RATE: 18 BRPM | DIASTOLIC BLOOD PRESSURE: 67 MMHG | HEART RATE: 66 BPM | WEIGHT: 274 LBS

## 2024-02-10 DIAGNOSIS — R11.0 NAUSEA: ICD-10-CM

## 2024-02-10 DIAGNOSIS — N39.0 ACUTE UTI: ICD-10-CM

## 2024-02-10 DIAGNOSIS — R68.89 FLU-LIKE SYMPTOMS: Primary | ICD-10-CM

## 2024-02-10 LAB
ALBUMIN SERPL BCP-MCNC: 4.2 G/DL (ref 3.4–5)
ALP SERPL-CCNC: 119 U/L (ref 33–110)
ALT SERPL W P-5'-P-CCNC: 13 U/L (ref 7–45)
ANION GAP SERPL CALC-SCNC: 14 MMOL/L (ref 10–20)
APPEARANCE UR: ABNORMAL
AST SERPL W P-5'-P-CCNC: 20 U/L (ref 9–39)
BACTERIA #/AREA URNS AUTO: ABNORMAL /HPF
BASOPHILS # BLD AUTO: 0.02 X10*3/UL (ref 0–0.1)
BASOPHILS NFR BLD AUTO: 0.3 %
BILIRUB SERPL-MCNC: 0.5 MG/DL (ref 0–1.2)
BILIRUB UR STRIP.AUTO-MCNC: NEGATIVE MG/DL
BNP SERPL-MCNC: 17 PG/ML (ref 0–99)
BUN SERPL-MCNC: 16 MG/DL (ref 6–23)
CALCIUM SERPL-MCNC: 9.6 MG/DL (ref 8.6–10.3)
CARDIAC TROPONIN I PNL SERPL HS: 6 NG/L (ref 0–13)
CHLORIDE SERPL-SCNC: 101 MMOL/L (ref 98–107)
CO2 SERPL-SCNC: 30 MMOL/L (ref 21–32)
COLOR UR: YELLOW
CREAT SERPL-MCNC: 0.8 MG/DL (ref 0.5–1.05)
EGFRCR SERPLBLD CKD-EPI 2021: 85 ML/MIN/1.73M*2
EOSINOPHIL # BLD AUTO: 0.02 X10*3/UL (ref 0–0.7)
EOSINOPHIL NFR BLD AUTO: 0.3 %
ERYTHROCYTE [DISTWIDTH] IN BLOOD BY AUTOMATED COUNT: 14.2 % (ref 11.5–14.5)
FLUAV RNA RESP QL NAA+PROBE: NOT DETECTED
FLUBV RNA RESP QL NAA+PROBE: NOT DETECTED
GLUCOSE SERPL-MCNC: 83 MG/DL (ref 74–99)
GLUCOSE UR STRIP.AUTO-MCNC: NEGATIVE MG/DL
HCT VFR BLD AUTO: 44.8 % (ref 36–46)
HGB BLD-MCNC: 14.5 G/DL (ref 12–16)
IMM GRANULOCYTES # BLD AUTO: 0.01 X10*3/UL (ref 0–0.7)
IMM GRANULOCYTES NFR BLD AUTO: 0.1 % (ref 0–0.9)
KETONES UR STRIP.AUTO-MCNC: ABNORMAL MG/DL
LEUKOCYTE ESTERASE UR QL STRIP.AUTO: NEGATIVE
LIPASE SERPL-CCNC: 25 U/L (ref 9–82)
LYMPHOCYTES # BLD AUTO: 2.33 X10*3/UL (ref 1.2–4.8)
LYMPHOCYTES NFR BLD AUTO: 34.7 %
MCH RBC QN AUTO: 27.8 PG (ref 26–34)
MCHC RBC AUTO-ENTMCNC: 32.4 G/DL (ref 32–36)
MCV RBC AUTO: 86 FL (ref 80–100)
MONOCYTES # BLD AUTO: 0.38 X10*3/UL (ref 0.1–1)
MONOCYTES NFR BLD AUTO: 5.7 %
MUCOUS THREADS #/AREA URNS AUTO: ABNORMAL /LPF
NEUTROPHILS # BLD AUTO: 3.96 X10*3/UL (ref 1.2–7.7)
NEUTROPHILS NFR BLD AUTO: 58.9 %
NITRITE UR QL STRIP.AUTO: NEGATIVE
NRBC BLD-RTO: 0 /100 WBCS (ref 0–0)
PH UR STRIP.AUTO: 5 [PH]
PLATELET # BLD AUTO: 333 X10*3/UL (ref 150–450)
POTASSIUM SERPL-SCNC: 3.7 MMOL/L (ref 3.5–5.3)
PROT SERPL-MCNC: 8.4 G/DL (ref 6.4–8.2)
PROT UR STRIP.AUTO-MCNC: ABNORMAL MG/DL
RBC # BLD AUTO: 5.22 X10*6/UL (ref 4–5.2)
RBC # UR STRIP.AUTO: NEGATIVE /UL
RBC #/AREA URNS AUTO: ABNORMAL /HPF
SARS-COV-2 RNA RESP QL NAA+PROBE: NOT DETECTED
SODIUM SERPL-SCNC: 141 MMOL/L (ref 136–145)
SP GR UR STRIP.AUTO: 1.03
SQUAMOUS #/AREA URNS AUTO: ABNORMAL /HPF
UROBILINOGEN UR STRIP.AUTO-MCNC: 2 MG/DL
WBC # BLD AUTO: 6.7 X10*3/UL (ref 4.4–11.3)
WBC #/AREA URNS AUTO: ABNORMAL /HPF

## 2024-02-10 PROCEDURE — 80053 COMPREHEN METABOLIC PANEL: CPT | Performed by: STUDENT IN AN ORGANIZED HEALTH CARE EDUCATION/TRAINING PROGRAM

## 2024-02-10 PROCEDURE — 84484 ASSAY OF TROPONIN QUANT: CPT | Performed by: STUDENT IN AN ORGANIZED HEALTH CARE EDUCATION/TRAINING PROGRAM

## 2024-02-10 PROCEDURE — 81001 URINALYSIS AUTO W/SCOPE: CPT | Performed by: STUDENT IN AN ORGANIZED HEALTH CARE EDUCATION/TRAINING PROGRAM

## 2024-02-10 PROCEDURE — 99283 EMERGENCY DEPT VISIT LOW MDM: CPT | Mod: 25

## 2024-02-10 PROCEDURE — 93005 ELECTROCARDIOGRAM TRACING: CPT

## 2024-02-10 PROCEDURE — 99284 EMERGENCY DEPT VISIT MOD MDM: CPT | Mod: 25 | Performed by: STUDENT IN AN ORGANIZED HEALTH CARE EDUCATION/TRAINING PROGRAM

## 2024-02-10 PROCEDURE — 85025 COMPLETE CBC W/AUTO DIFF WBC: CPT | Performed by: STUDENT IN AN ORGANIZED HEALTH CARE EDUCATION/TRAINING PROGRAM

## 2024-02-10 PROCEDURE — 83880 ASSAY OF NATRIURETIC PEPTIDE: CPT | Performed by: STUDENT IN AN ORGANIZED HEALTH CARE EDUCATION/TRAINING PROGRAM

## 2024-02-10 PROCEDURE — 71045 X-RAY EXAM CHEST 1 VIEW: CPT

## 2024-02-10 PROCEDURE — 83690 ASSAY OF LIPASE: CPT | Performed by: STUDENT IN AN ORGANIZED HEALTH CARE EDUCATION/TRAINING PROGRAM

## 2024-02-10 PROCEDURE — 36415 COLL VENOUS BLD VENIPUNCTURE: CPT | Performed by: STUDENT IN AN ORGANIZED HEALTH CARE EDUCATION/TRAINING PROGRAM

## 2024-02-10 PROCEDURE — 2500000001 HC RX 250 WO HCPCS SELF ADMINISTERED DRUGS (ALT 637 FOR MEDICARE OP): Performed by: STUDENT IN AN ORGANIZED HEALTH CARE EDUCATION/TRAINING PROGRAM

## 2024-02-10 PROCEDURE — 71045 X-RAY EXAM CHEST 1 VIEW: CPT | Performed by: RADIOLOGY

## 2024-02-10 PROCEDURE — 87636 SARSCOV2 & INF A&B AMP PRB: CPT | Performed by: STUDENT IN AN ORGANIZED HEALTH CARE EDUCATION/TRAINING PROGRAM

## 2024-02-10 RX ORDER — CEPHALEXIN 500 MG/1
500 CAPSULE ORAL 2 TIMES DAILY
Qty: 10 CAPSULE | Refills: 0 | Status: SHIPPED | OUTPATIENT
Start: 2024-02-10 | End: 2024-02-15

## 2024-02-10 RX ORDER — CEPHALEXIN 500 MG/1
500 CAPSULE ORAL ONCE
Status: COMPLETED | OUTPATIENT
Start: 2024-02-10 | End: 2024-02-10

## 2024-02-10 RX ADMIN — CEPHALEXIN 500 MG: 500 CAPSULE ORAL at 18:35

## 2024-02-10 ASSESSMENT — COLUMBIA-SUICIDE SEVERITY RATING SCALE - C-SSRS
6. HAVE YOU EVER DONE ANYTHING, STARTED TO DO ANYTHING, OR PREPARED TO DO ANYTHING TO END YOUR LIFE?: NO
2. HAVE YOU ACTUALLY HAD ANY THOUGHTS OF KILLING YOURSELF?: NO
1. IN THE PAST MONTH, HAVE YOU WISHED YOU WERE DEAD OR WISHED YOU COULD GO TO SLEEP AND NOT WAKE UP?: NO

## 2024-02-10 ASSESSMENT — PAIN - FUNCTIONAL ASSESSMENT: PAIN_FUNCTIONAL_ASSESSMENT: 0-10

## 2024-02-10 ASSESSMENT — PAIN SCALES - GENERAL: PAINLEVEL_OUTOF10: 3

## 2024-02-10 NOTE — DISCHARGE INSTRUCTIONS
You have been seen at a [facility].  Please follow-up with your primary care provider in the next 1 to 2 days for further evaluation and routine follow-up.  Please return to the emergency room if having any worsening symptoms.  Please follow-up with any specialists if discussed during your emergency room stay.

## 2024-02-10 NOTE — ED PROVIDER NOTES
"EMERGENCY MEDICINE EVALUATION NOTE    History of Present Illness     Chief Complaint:   Chief Complaint   Patient presents with    Abnormal ECG     Pt sent from  for abnormal ECG. Pt reports diaphoresis, ARTHUR shoulder pain, SOB x5 days. Hx of stroke 2021 with residual RLE weakness and speech difficulties. Pt denies CP, lightheadedness/ dizziness, abd pain. Pt reports nausea x3 days.        HPI: Bisi Schwarz is a 58 y.o. female with past medical history of bipolar disorder, schizophrenia, hypothyroidism, and CVA with residual right lower extremity weakness and speech difficulties who presents with complaint of abnormal EKG.  Patient was sent here from urgent care due to a reported abnormal EKG.  Patient states for the past 5 days she has had night sweats with diaphoresis as well as bilateral upper shoulder pain and shortness of breath.  She denies any chest pain, lightheadedness, dizziness, abdominal pain.  She does admit nausea without emesis and does admit that she is passing gas more frequently although denies any diarrhea or constipation.  Denies any dysuria or hematuria.  She does note subjective fevers at home.  Denies any history of heart disease although does admit in the family.  Denies history of DVT or PE.    Previous History     Past Medical History:   Diagnosis Date    Personal history of other mental and behavioral disorders     History of bipolar disorder     Past Surgical History:   Procedure Laterality Date    BREAST BIOPSY  03/18/2014    Biopsy Breast Open    TOTAL ABDOMINAL HYSTERECTOMY  03/18/2014    Total Abdominal Hysterectomy    VARICOSE VEIN SURGERY  09/29/2014    Varicose Vein Ligation     Social History     Tobacco Use    Smoking status: Never    Smokeless tobacco: Never   Substance Use Topics    Alcohol use: Never    Drug use: Never     No family history on file.  Allergies   Allergen Reactions    Penicillins Dizziness and Hives     States \"falls out\"    Other reaction(s): Dizziness, " "Mental Status Change   States \"falls out\"    Pork Derived (Porcine) Headache    Codeine Diarrhea, Hives and Rash    Sulfa (Sulfonamide Antibiotics) Diarrhea, Rash, Hives and Dizziness     Current Outpatient Medications   Medication Instructions    albuterol (Proventil HFA) 90 mcg/actuation inhaler 2 puffs, inhalation, Every 4 hours PRN    amitriptyline (Elavil) 25 mg tablet     atorvastatin (LIPITOR) 20 mg, oral, Daily    calcium carbonate-vitamin D3 (Oyster Shell Calcium-Vit D3) 500 mg-5 mcg (200 unit) tablet 1 tablet, oral, Daily    cephalexin (KEFLEX) 500 mg, oral, 2 times daily    diclofenac sodium (Voltaren) 1 % gel gel 1 Application, Topical, 4 times daily    dulaglutide (Trulicity) 0.75 mg/0.5 mL pen injector INJECT ONCE WEEKLY SUBCUTANEOUSLY    hydroCHLOROthiazide (HYDRODIURIL) 25 mg, oral, Daily    levothyroxine (SYNTHROID, LEVOXYL) 112 mcg, oral, Daily       Physical Exam     Appearance: Alert, oriented , cooperative,  in acute distress. Well nourished & well hydrated.     Skin: Intact,  dry skin, no lesions, rash, petechiae or purpura.      Eyes: PERRLA, EOMs intact,  Conjunctiva pink with no redness or exudates. Cornea & anterior chamber are clear, Eyelids without lesions. No scleral icterus.      ENT: Hearing grossly intact. External auditory canals patent, tympanic membranes intact with visible landmarks. Nares patent, mucus membranes moist. Dentition without lesions. Pharynx clear, uvula midline.      Neck: Supple, without meningismus. Thyroid not palpable. Trachea at midline. No lymphadenopathy.     Pulmonary: Clear bilaterally with good chest wall excursion. No rales, rhonchi or wheezing. No accessory muscle use or stridor.     Cardiac: Normal S1, S2 without murmur, rub, gallop or extrasystole. No JVD, Carotids without bruits.     Abdomen: Soft, nontender, active bowel sounds.  No palpable organomegaly.  No rebound or guarding.  No CVA tenderness.     Genitourinary: Exam deferred.   "   Musculoskeletal: Full range of motion. no pain, edema, or deformity. Pulses full and equal. No cyanosis or clubbing.      Neurological:  Cranial nerves II through XII are grossly intact, finger-nose touch is normal, normal sensation, no weakness, no focal findings identified.     Psychiatric: Appropriate mood and affect.      Results     Labs Reviewed   CBC WITH AUTO DIFFERENTIAL - Abnormal       Result Value    WBC 6.7      nRBC 0.0      RBC 5.22 (*)     Hemoglobin 14.5      Hematocrit 44.8      MCV 86      MCH 27.8      MCHC 32.4      RDW 14.2      Platelets 333      Neutrophils % 58.9      Immature Granulocytes %, Automated 0.1      Lymphocytes % 34.7      Monocytes % 5.7      Eosinophils % 0.3      Basophils % 0.3      Neutrophils Absolute 3.96      Immature Granulocytes Absolute, Automated 0.01      Lymphocytes Absolute 2.33      Monocytes Absolute 0.38      Eosinophils Absolute 0.02      Basophils Absolute 0.02     COMPREHENSIVE METABOLIC PANEL - Abnormal    Glucose 83      Sodium 141      Potassium 3.7      Chloride 101      Bicarbonate 30      Anion Gap 14      Urea Nitrogen 16      Creatinine 0.80      eGFR 85      Calcium 9.6      Albumin 4.2      Alkaline Phosphatase 119 (*)     Total Protein 8.4 (*)     AST 20      Bilirubin, Total 0.5      ALT 13     URINALYSIS WITH REFLEX MICROSCOPIC - Abnormal    Color, Urine Yellow      Appearance, Urine Hazy (*)     Specific Gravity, Urine 1.031      pH, Urine 5.0      Protein, Urine 30 (1+) (*)     Glucose, Urine NEGATIVE      Blood, Urine NEGATIVE      Ketones, Urine 80 (2+) (*)     Bilirubin, Urine NEGATIVE      Urobilinogen, Urine 2.0 (*)     Nitrite, Urine NEGATIVE      Leukocyte Esterase, Urine NEGATIVE     MICROSCOPIC ONLY, URINE - Abnormal    WBC, Urine 6-10 (*)     RBC, Urine NONE      Squamous Epithelial Cells, Urine 10-25 (FEW)      Bacteria, Urine 1+ (*)     Mucus, Urine 4+     LIPASE - Normal    Lipase 25      Narrative:     Venipuncture immediately  after or during the administration of Metamizole may lead to falsely low results. Testing should be performed immediately prior to Metamizole dosing.   TROPONIN I, HIGH SENSITIVITY - Normal    Troponin I, High Sensitivity 6      Narrative:     Less than 99th percentile of normal range cutoff-  Female and children under 18 years old <14 ng/L; Male <21 ng/L: Negative  Repeat testing should be performed if clinically indicated.     Female and children under 18 years old 14-50 ng/L; Male 21-50 ng/L:  Consistent with possible cardiac damage and possible increased clinical   risk. Serial measurements may help to assess extent of myocardial damage.     >50 ng/L: Consistent with cardiac damage, increased clinical risk and  myocardial infarction. Serial measurements may help assess extent of   myocardial damage.      NOTE: Children less than 1 year old may have higher baseline troponin   levels and results should be interpreted in conjunction with the overall   clinical context.     NOTE: Troponin I testing is performed using a different   testing methodology at [facility] than at other   [facility]. Direct result comparisons should only   be made within the same method.   B-TYPE NATRIURETIC PEPTIDE - Normal    BNP 17      Narrative:        <100 pg/mL - Heart failure unlikely  100-299 pg/mL - Intermediate probability of acute heart                  failure exacerbation. Correlate with clinical                  context and patient history.    >=300 pg/mL - Heart Failure likely. Correlate with clinical                  context and patient history.    BNP testing is performed using different testing methodology at [facility] than at other [facility]. Direct result comparisons should only be made within the same method.      SARS-COV-2 AND INFLUENZA A/B PCR - Normal    Flu A Result Not Detected      Flu B Result Not Detected      Coronavirus 2019, PCR Not Detected      Narrative:     This  "assay has received FDA Emergency Use Authorization (EUA) and  is only authorized for the duration of time that circumstances exist to justify the authorization of the emergency use of in vitro diagnostic tests for the detection of SARS-CoV-2 virus and/or diagnosis of COVID-19 infection under section 564(b)(1) of the Act, 21 U.S.C. 360bbb-3(b)(1). Testing for SARS-CoV-2 is only recommended for patients who meet current clinical and/or epidemiological criteria as defined by federal, state, or local public health directives. This assay is an in vitro diagnostic nucleic acid amplification test for the qualitative detection of SARS-CoV-2, Influenza A, and Influenza B from nasopharyngeal specimens and has been validated for use at [facility]. Negative results do not preclude COVID-19 infections or Influenza A/B infections, and should not be used as the sole basis for diagnosis, treatment, or other management decisions. If Influenza A/B and RSV PCR results are negative, testing for Parainfluenza virus, Adenovirus and Metapneumovirus is routinely performed for [facility] pediatric oncology and intensive care inpatients, and is available on other patients by placing an add-on request.      XR chest 1 view   Final Result   1.  No evidence of acute cardiopulmonary process.                  MACRO:   None        Signed by: Live Le 2/10/2024 2:32 PM   Dictation workstation:   [id number]            ED Course & Medical Decision Making     Medications   cephalexin (Keflex) capsule 500 mg (has no administration in time range)     Diagnoses as of 02/10/24 1718   Flu-like symptoms   Nausea   Acute UTI     Heart Rate:  [64-75]   Temperature:  [36.7 °C (98.1 °F)]   Respirations:  [18]   BP: (116-147)/(55-81)   Height:  [175.3 cm (5' 9\")]   Weight:  [124 kg (274 lb)]   Pulse Ox:  [97 %-100 %]      Bisi Schwarz is a 58 y.o. female with past medical history of bipolar disorder, schizophrenia, hypothyroidism, " and CVA with residual right lower extremity weakness and speech difficulties who presents with complaint of abnormal EKG. EKG did show normal sinus rhythm with a rate of 72 bpm with incomplete right bundle branch block and very mild ST depression noted throughout all leads without any ST elevation or significant arrhythmia.  Patient otherwise hemodynamic stable and afebrile and well-appearing.  No significant reproducible abdominal pain noted.  Differential does include but is not limited to ACS, influenza, COVID-19, upper respiratory viral illness, pneumonia.  Initial lab work reassuring with normal LFTs and lipase.  Troponin negative.  No leukocytosis, anemia, or significant electrolyte normality.  Influenza and COVID-19 testing were negative.  Urinalysis with possible urinary tract infection with 6-10 white cells and 1+ bacteria although could be contaminated.  She does admit some urinary symptoms and will be treated with Keflex.  Chest x-ray otherwise nonacute.  Remained hemodynamically stable.  Could be a self-limiting upper respiratory viral illness.  Low concern for ACS.  Will be discharged home with 5-day course of Keflex and primary care follow-up.    Procedures   Procedures    Diagnosis     1. Flu-like symptoms    2. Nausea    3. Acute UTI        Disposition     DISCHARGE.  The patient is discharged back to their place of residence.  Discharge diagnosis, instructions and plan were discussed and understood. At the time of discharge the patient was comfortable and was in no apparent distress. Patient is aware of diagnostic uncertainty and was notified though testing is negative here, there is a very small chance that pathology may be missed.  The patient understands these risks and the patient /family understood to return immediately to the emergency department if the symptoms worsen or if they have any additional concerns.    FOLLOW UP  Primary care provider in 1-2 days.        ED Prescriptions        Medication Sig Dispense Start Date End Date Auth. Provider    cephalexin (Keflex) 500 mg capsule Take 1 capsule (500 mg) by mouth 2 times a day for 5 days. 10 capsule 2/10/2024 2/15/2024 Marcin Wiseman, DO Marcin Wiseman,   02/10/24 1718

## 2024-02-12 LAB
ATRIAL RATE: 72 BPM
P AXIS: 71 DEGREES
P OFFSET: 196 MS
P ONSET: 145 MS
PR INTERVAL: 154 MS
Q ONSET: 222 MS
QRS COUNT: 12 BEATS
QRS DURATION: 104 MS
QT INTERVAL: 408 MS
QTC CALCULATION(BAZETT): 446 MS
QTC FREDERICIA: 433 MS
R AXIS: 76 DEGREES
T AXIS: 40 DEGREES
T OFFSET: 426 MS
VENTRICULAR RATE: 72 BPM

## 2024-02-21 ENCOUNTER — HOSPITAL ENCOUNTER (OUTPATIENT)
Dept: CARDIOLOGY | Facility: CLINIC | Age: 59
Discharge: HOME | End: 2024-02-21
Payer: MEDICARE

## 2024-02-21 DIAGNOSIS — R60.1 GENERALIZED EDEMA: ICD-10-CM

## 2024-02-21 DIAGNOSIS — R06.01 ORTHOPNEA: ICD-10-CM

## 2024-02-21 DIAGNOSIS — I10 PRIMARY HYPERTENSION: ICD-10-CM

## 2024-02-21 DIAGNOSIS — Z01.89 ENCOUNTER FOR OTHER SPECIFIED SPECIAL EXAMINATIONS: ICD-10-CM

## 2024-02-21 DIAGNOSIS — N18.2 CKD (CHRONIC KIDNEY DISEASE) STAGE 2, GFR 60-89 ML/MIN: ICD-10-CM

## 2024-02-21 LAB
AORTIC VALVE MEAN GRADIENT: 4.5 MMHG
AORTIC VALVE PEAK VELOCITY: 1.45 M/S
AV PEAK GRADIENT: 8.4 MMHG
AVA (PEAK VEL): 2.3 CM2
AVA (VTI): 2.65 CM2
EJECTION FRACTION APICAL 4 CHAMBER: 60.4
EJECTION FRACTION: 64 %
LEFT ATRIUM VOLUME AREA LENGTH INDEX BSA: 26.5 ML/M2
LEFT VENTRICLE INTERNAL DIMENSION DIASTOLE: 4.07 CM (ref 3.5–6)
LEFT VENTRICULAR OUTFLOW TRACT DIAMETER: 2.05 CM
MITRAL VALVE E/A RATIO: 1.79
RIGHT VENTRICLE FREE WALL PEAK S': 16 CM/S
TRICUSPID ANNULAR PLANE SYSTOLIC EXCURSION: 2.5 CM

## 2024-02-21 PROCEDURE — 93306 TTE W/DOPPLER COMPLETE: CPT

## 2024-02-21 PROCEDURE — 93306 TTE W/DOPPLER COMPLETE: CPT | Performed by: INTERNAL MEDICINE

## 2024-02-27 ENCOUNTER — LAB (OUTPATIENT)
Dept: LAB | Facility: LAB | Age: 59
End: 2024-02-27
Payer: MEDICARE

## 2024-02-27 DIAGNOSIS — E11.9 TYPE 2 DIABETES MELLITUS WITHOUT COMPLICATION, WITHOUT LONG-TERM CURRENT USE OF INSULIN (MULTI): ICD-10-CM

## 2024-02-27 DIAGNOSIS — E03.8 OTHER SPECIFIED HYPOTHYROIDISM: ICD-10-CM

## 2024-02-27 DIAGNOSIS — R73.09 OTHER ABNORMAL GLUCOSE: ICD-10-CM

## 2024-02-27 DIAGNOSIS — R60.1 GENERALIZED EDEMA: ICD-10-CM

## 2024-02-27 LAB
ALBUMIN SERPL BCP-MCNC: 3.7 G/DL (ref 3.4–5)
ALP SERPL-CCNC: 115 U/L (ref 33–110)
ALT SERPL W P-5'-P-CCNC: 11 U/L (ref 7–45)
ANION GAP SERPL CALC-SCNC: 15 MMOL/L (ref 10–20)
AST SERPL W P-5'-P-CCNC: 11 U/L (ref 9–39)
BILIRUB SERPL-MCNC: 0.4 MG/DL (ref 0–1.2)
BUN SERPL-MCNC: 12 MG/DL (ref 6–23)
CALCIUM SERPL-MCNC: 9.6 MG/DL (ref 8.6–10.6)
CHLORIDE SERPL-SCNC: 104 MMOL/L (ref 98–107)
CO2 SERPL-SCNC: 28 MMOL/L (ref 21–32)
CREAT SERPL-MCNC: 0.91 MG/DL (ref 0.5–1.05)
EGFRCR SERPLBLD CKD-EPI 2021: 73 ML/MIN/1.73M*2
ERYTHROCYTE [DISTWIDTH] IN BLOOD BY AUTOMATED COUNT: 14.2 % (ref 11.5–14.5)
GLUCOSE SERPL-MCNC: 102 MG/DL (ref 74–99)
HCT VFR BLD AUTO: 44 % (ref 36–46)
HGB BLD-MCNC: 13.9 G/DL (ref 12–16)
MCH RBC QN AUTO: 26.6 PG (ref 26–34)
MCHC RBC AUTO-ENTMCNC: 31.6 G/DL (ref 32–36)
MCV RBC AUTO: 84 FL (ref 80–100)
NRBC BLD-RTO: 0 /100 WBCS (ref 0–0)
PLATELET # BLD AUTO: 312 X10*3/UL (ref 150–450)
POTASSIUM SERPL-SCNC: 3.7 MMOL/L (ref 3.5–5.3)
PROT SERPL-MCNC: 7.1 G/DL (ref 6.4–8.2)
RBC # BLD AUTO: 5.23 X10*6/UL (ref 4–5.2)
SODIUM SERPL-SCNC: 143 MMOL/L (ref 136–145)
T4 FREE SERPL-MCNC: 1.36 NG/DL (ref 0.78–1.48)
TSH SERPL-ACNC: 0.8 MIU/L (ref 0.44–3.98)
WBC # BLD AUTO: 7.2 X10*3/UL (ref 4.4–11.3)

## 2024-02-27 PROCEDURE — 84443 ASSAY THYROID STIM HORMONE: CPT

## 2024-02-27 PROCEDURE — 84439 ASSAY OF FREE THYROXINE: CPT

## 2024-02-27 PROCEDURE — 36415 COLL VENOUS BLD VENIPUNCTURE: CPT

## 2024-02-27 PROCEDURE — 85027 COMPLETE CBC AUTOMATED: CPT

## 2024-02-27 PROCEDURE — 80053 COMPREHEN METABOLIC PANEL: CPT

## 2024-02-28 ENCOUNTER — APPOINTMENT (OUTPATIENT)
Dept: NEUROLOGY | Facility: CLINIC | Age: 59
End: 2024-02-28
Payer: MEDICARE

## 2024-03-04 ENCOUNTER — TREATMENT (OUTPATIENT)
Dept: PHYSICAL THERAPY | Facility: CLINIC | Age: 59
End: 2024-03-04
Payer: MEDICARE

## 2024-03-04 DIAGNOSIS — M54.40 ACUTE RIGHT-SIDED LOW BACK PAIN WITH SCIATICA, SCIATICA LATERALITY UNSPECIFIED: Primary | ICD-10-CM

## 2024-03-04 PROCEDURE — 97110 THERAPEUTIC EXERCISES: CPT | Mod: GP

## 2024-03-04 NOTE — PROGRESS NOTES
"Physical Therapy    Physical Therapy Treatment    Patient Name: Bisi Schwarz  MRN: [id number]  Today's Date: 3/4/2024  Time Calculation  Start Time: 1335  Stop Time: 1420  Time Calculation (min): 45 min     PT Therapeutic Procedures Time Entry  Therapeutic Exercise Time Entry: 45  Visit: 5    Assessment:  PT Assessment  Assessment Comment: The patient did well with the progressive strengthening today.  Plan:  OP PT Plan  Treatment/Interventions: Cryotherapy, Dry needling, Education/ Instruction, Electrical stimulation, Hot pack, Mechanical traction, Neuromuscular re-education, Therapeutic activities, Therapeutic exercises  PT Plan: Skilled PT  PT Frequency: 1 time per week  Duration: 8 weeks  Certification Period Start Date: 11/27/23  Certification Period End Date: 02/25/24  Rehab Potential: Excellent  Plan of Care Agreement: Patient    Current Problem  1. Acute right-sided low back pain with sciatica, sciatica laterality unspecified            Subjective:   General  Reason for Referral: low back pain  Referred By: Doc Vasquez MD  General Comment: Patient reports no back and hip pain currently. She notes her right knee is painful though.    Objective     Treatments:  Therapeutic Exercise  Therapeutic Exercise Performed: Yes  Therapeutic Exercise Activity 1: back extension #60 3 x 10  Therapeutic Exercise Activity 2: HSC #33 3 x 10  Therapeutic Exercise Activity 3: KE #11 3 x 10  Therapeutic Exercise Activity 4: 4 way hip #10 x 10 R/L  Therapeutic Exercise Activity 5: bike x 5 min  Therapeutic Exercise Activity 6: Leg press #80 x 20 #40 heel raises x 20  Therapeutic Exercise Activity 7: Farmer's carry #7.5 KB x 4 laps in clinic  Therapeutic Exercise Activity 8: swiss ball DKTC 20 x 3\"  Therapeutic Exercise Activity 9: bridge 2 x 10 x 3\"  Therapeutic Exercise Activity 10: SLR 2 x 10    Goals:  Active       PT Problem       Patient will report compliance with her home exercise program.        Start:  11/27/23    " Expected End:  02/25/24            Patient will report improvement via the MEENA to show improvements in activity tolerance.        Start:  11/27/23    Expected End:  02/25/24            Patient will demonstrate improvements in core and hip strength to 5/5 to improve lumbopelvic stability with daily activities.        Start:  11/27/23    Expected End:  02/25/24

## 2024-03-12 ENCOUNTER — TREATMENT (OUTPATIENT)
Dept: PHYSICAL THERAPY | Facility: CLINIC | Age: 59
End: 2024-03-12
Payer: MEDICARE

## 2024-03-12 DIAGNOSIS — M54.40 LOW BACK PAIN WITH SCIATICA, SCIATICA LATERALITY UNSPECIFIED, UNSPECIFIED BACK PAIN LATERALITY, UNSPECIFIED CHRONICITY: ICD-10-CM

## 2024-03-12 PROCEDURE — 97110 THERAPEUTIC EXERCISES: CPT | Mod: GP,CQ | Performed by: PHYSICAL THERAPY ASSISTANT

## 2024-03-12 NOTE — PROGRESS NOTES
"Physical Therapy    Physical Therapy Treatment    Patient Name: Bisi Schwarz  MRN: [id number]  Today's Date: 3/12/2024  Time Calculation  Start Time: 1030  Stop Time: 1115  Time Calculation (min): 45 min     PT Therapeutic Procedures Time Entry  Therapeutic Exercise Time Entry: 45           PT Therapeutic Procedures Time Entry  Therapeutic Exercise Time Entry: 45  Visit: 8    Assessment:  Able to progress 4 way hip w/ hold time w/o increase knee or back symptoms.   Plan:  Next visit w/ evaluating PT for possible transition to HEP.     Current Problem  1. Low back pain with sciatica, sciatica laterality unspecified, unspecified back pain laterality, unspecified chronicity  Follow Up In Physical Therapy          Subjective:   Her knee is bothering her . No back c/o today.   General  Reason for Referral: low back pain  Referred By: Doc Vasquez MD  General Comment: Patient reports no back and hip pain currently. She notes her right knee is painful though.    Objective   Good form  w/ 4 way hip   Treatments:  Therapeutic Exercise  Therapeutic Exercise Performed: Yes  Therapeutic Exercise Activity 1: back extension #60 3 x 10  Therapeutic Exercise Activity 2: HSC #33 3 x 10  Therapeutic Exercise Activity 3: KE #11 3 x 10  Therapeutic Exercise Activity 4: 4 way hip #10 x 10 R/L  Therapeutic Exercise Activity 5: bike x 5 min  Therapeutic Exercise Activity 6: Leg press #80 x 20 #40 heel raises x 20  Therapeutic Exercise Activity 7: Farmer's carry #7.5 KB x 4 laps in clinic  Therapeutic Exercise Activity 8: swiss ball DKTC 20 x 3\"  Therapeutic Exercise Activity 9: bridge 2 x 10 x 3\"  Therapeutic Exercise Activity 10: SLR 2 x 10  Therapeutic Exercise Activity 11: side lying clamshells (red TB) 2 x 10  4 way hip w/ RTB x 5 reps each direction (added to HEP)    Goals:  Active       PT Problem       Patient will report compliance with her home exercise program.        Start:  11/27/23    Expected End:  02/25/24            " Patient will report improvement via the MEENA to show improvements in activity tolerance.        Start:  11/27/23    Expected End:  02/25/24            Patient will demonstrate improvements in core and hip strength to 5/5 to improve lumbopelvic stability with daily activities.        Start:  11/27/23    Expected End:  02/25/24

## 2024-03-14 DIAGNOSIS — E11.9 TYPE 2 DIABETES MELLITUS WITHOUT COMPLICATION, WITHOUT LONG-TERM CURRENT USE OF INSULIN (MULTI): Primary | ICD-10-CM

## 2024-03-14 RX ORDER — LANCETS 33 GAUGE
EACH MISCELLANEOUS
Qty: 100 EACH | Refills: 0 | Status: SHIPPED | OUTPATIENT
Start: 2024-03-14

## 2024-03-29 ENCOUNTER — OFFICE VISIT (OUTPATIENT)
Dept: PRIMARY CARE | Facility: CLINIC | Age: 59
End: 2024-03-29
Payer: MEDICARE

## 2024-03-29 ENCOUNTER — HOSPITAL ENCOUNTER (OUTPATIENT)
Dept: RADIOLOGY | Facility: HOSPITAL | Age: 59
Discharge: HOME | End: 2024-03-29
Payer: MEDICARE

## 2024-03-29 VITALS
SYSTOLIC BLOOD PRESSURE: 112 MMHG | DIASTOLIC BLOOD PRESSURE: 82 MMHG | TEMPERATURE: 98.3 F | HEIGHT: 69 IN | HEART RATE: 77 BPM | OXYGEN SATURATION: 95 % | WEIGHT: 273 LBS | BODY MASS INDEX: 40.43 KG/M2

## 2024-03-29 DIAGNOSIS — M25.551 RIGHT HIP PAIN: ICD-10-CM

## 2024-03-29 DIAGNOSIS — M25.561 ACUTE PAIN OF RIGHT KNEE: ICD-10-CM

## 2024-03-29 DIAGNOSIS — M54.31 SCIATICA OF RIGHT SIDE: Primary | ICD-10-CM

## 2024-03-29 DIAGNOSIS — E03.8 OTHER SPECIFIED HYPOTHYROIDISM: ICD-10-CM

## 2024-03-29 DIAGNOSIS — M54.31 SCIATICA OF RIGHT SIDE: ICD-10-CM

## 2024-03-29 PROCEDURE — 3079F DIAST BP 80-89 MM HG: CPT | Performed by: STUDENT IN AN ORGANIZED HEALTH CARE EDUCATION/TRAINING PROGRAM

## 2024-03-29 PROCEDURE — 73502 X-RAY EXAM HIP UNI 2-3 VIEWS: CPT | Mod: RT

## 2024-03-29 PROCEDURE — 73562 X-RAY EXAM OF KNEE 3: CPT | Mod: RT

## 2024-03-29 PROCEDURE — 72110 X-RAY EXAM L-2 SPINE 4/>VWS: CPT

## 2024-03-29 PROCEDURE — 73562 X-RAY EXAM OF KNEE 3: CPT | Mod: RIGHT SIDE | Performed by: RADIOLOGY

## 2024-03-29 PROCEDURE — 99213 OFFICE O/P EST LOW 20 MIN: CPT | Performed by: STUDENT IN AN ORGANIZED HEALTH CARE EDUCATION/TRAINING PROGRAM

## 2024-03-29 PROCEDURE — 3074F SYST BP LT 130 MM HG: CPT | Performed by: STUDENT IN AN ORGANIZED HEALTH CARE EDUCATION/TRAINING PROGRAM

## 2024-03-29 PROCEDURE — 72110 X-RAY EXAM L-2 SPINE 4/>VWS: CPT | Performed by: RADIOLOGY

## 2024-03-29 PROCEDURE — 73502 X-RAY EXAM HIP UNI 2-3 VIEWS: CPT | Mod: RIGHT SIDE | Performed by: RADIOLOGY

## 2024-03-29 RX ORDER — FERROUS SULFATE, DRIED 160(50) MG
1 TABLET, EXTENDED RELEASE ORAL DAILY
Qty: 30 TABLET | Refills: 3 | Status: SHIPPED | OUTPATIENT
Start: 2024-03-29 | End: 2025-03-29

## 2024-03-29 ASSESSMENT — PAIN SCALES - GENERAL: PAINLEVEL: 2

## 2024-03-29 NOTE — PROGRESS NOTES
"  Subjective   Patient ID: Bisi Schwarz is a 58 y.o. female who presents for Follow-up (Pt is here for a follow up from the ER and her hip, knee, echocardiogram. ).     Interval changes:  Right hip and knee pain    1. Right knee and hip pain  No h/o gout  No red emat, no tobacco, no alcohol  No trauma, no fall  Has been doing leg machines more at PT  Patient traces a path of pain and ache from the right lumbar spine, through the poserior/lateral hip, lateral and posterior knee, and onto the lateral leg  Has been doing physical therapy, last appt next week               Review of Systems   All other systems reviewed and are negative.      Objective   /82 (BP Location: Left arm, Patient Position: Sitting)   Pulse 77   Temp 36.8 °C (98.3 °F) (Temporal)   Ht 1.753 m (5' 9\")   Wt 124 kg (273 lb)   SpO2 95%   BMI 40.32 kg/m²     Physical Exam  Vitals reviewed.   Constitutional:       Appearance: Normal appearance.   HENT:      Head: Normocephalic and atraumatic.      Mouth/Throat:      Mouth: Mucous membranes are moist.      Pharynx: Oropharynx is clear.   Eyes:      Extraocular Movements: Extraocular movements intact.      Conjunctiva/sclera: Conjunctivae normal.      Pupils: Pupils are equal, round, and reactive to light.   Cardiovascular:      Rate and Rhythm: Normal rate and regular rhythm.      Pulses: Normal pulses.      Heart sounds: Normal heart sounds.   Pulmonary:      Effort: Pulmonary effort is normal.      Breath sounds: Normal breath sounds.   Abdominal:      General: Abdomen is flat. Bowel sounds are normal.      Palpations: Abdomen is soft.   Musculoskeletal:      Cervical back: Normal range of motion and neck supple.      Right hip: Tenderness present. No bony tenderness or crepitus. Decreased range of motion.      Left hip: Normal.      Right knee: No bony tenderness. Decreased range of motion. No tenderness. No LCL laxity, MCL laxity, ACL laxity or PCL laxity. Normal alignment, normal " meniscus and normal patellar mobility.      Left knee: Normal.      Comments: Right sided +SLR test  Patient unable to participate in instability testing  Antalgic gait of RLE  Mild TTP of the right proximal IT band and right hip joint   Skin:     General: Skin is warm and dry.      Capillary Refill: Capillary refill takes less than 2 seconds.   Neurological:      General: No focal deficit present.      Mental Status: She is alert.   Psychiatric:         Mood and Affect: Mood normal.         Behavior: Behavior normal.         Assessment/Plan   Problem List Items Addressed This Visit             ICD-10-CM    Hypothyroidism E03.9    Relevant Medications    calcium carbonate-vitamin D3 (Oyster Shell Calcium-Vit D3) 500 mg-5 mcg (200 unit) tablet     Other Visit Diagnoses         Codes    Sciatica of right side    -  Primary M54.31    Relevant Orders    XR hip right with pelvis when performed 2 or 3 views (Completed)    XR lumbar spine complete 4+ views (Completed)    XR knee right 3 views (Completed)    Right hip pain     M25.551    Relevant Orders    XR hip right with pelvis when performed 2 or 3 views (Completed)    Acute pain of right knee     M25.561    Relevant Orders    XR knee right 3 views (Completed)                   -------------------------------------------------------------------------------------------------------------------    **This note was entered into the electronic medical record using Dragon medical dictation software, and was not edited thereafter. Please excuse any errors of spelling or grammar.**    -------------------------------------------------------------------------------------------------------------------    OVERALL PLAN:  [ ] XR: lumbar spine, right hip, right knee  [ ] refilled oyster shell vitamin D3

## 2024-04-02 ENCOUNTER — DOCUMENTATION (OUTPATIENT)
Dept: PHYSICAL THERAPY | Facility: CLINIC | Age: 59
End: 2024-04-02
Payer: MEDICARE

## 2024-04-02 ENCOUNTER — APPOINTMENT (OUTPATIENT)
Dept: PHYSICAL THERAPY | Facility: CLINIC | Age: 59
End: 2024-04-02
Payer: MEDICARE

## 2024-04-02 NOTE — PROGRESS NOTES
Physical Therapy    Discharge Summary    Name: Bisi Schwarz  MRN: [id number]  : 1965  Date: 24    Discharge Summary: PT    Discharge Information: Date of discharge 24, Date of last visit 3/12/24, Date of evaluation 23, Number of attended visits 8, Referred by Doc Vasquez MD, and Referred for low back pain    Therapy Summary: Plan of care consisted of improving strength and ROM.     Discharge Status: Discharge to a home exercise program.      Rehab Discharge Reason: Achieved all and/or the most significant goals(s)

## 2024-04-11 ENCOUNTER — OFFICE VISIT (OUTPATIENT)
Dept: OPHTHALMOLOGY | Facility: CLINIC | Age: 59
End: 2024-04-11
Payer: COMMERCIAL

## 2024-04-11 DIAGNOSIS — H52.223 REGULAR ASTIGMATISM OF BOTH EYES: ICD-10-CM

## 2024-04-11 DIAGNOSIS — E11.9 TYPE 2 DIABETES MELLITUS WITHOUT COMPLICATION, WITHOUT LONG-TERM CURRENT USE OF INSULIN (MULTI): ICD-10-CM

## 2024-04-11 DIAGNOSIS — G89.29 CHRONIC MIDLINE LOW BACK PAIN WITHOUT SCIATICA: Primary | ICD-10-CM

## 2024-04-11 DIAGNOSIS — M51.36 LUMBAR DEGENERATIVE DISC DISEASE: ICD-10-CM

## 2024-04-11 DIAGNOSIS — M54.50 CHRONIC MIDLINE LOW BACK PAIN WITHOUT SCIATICA: Primary | ICD-10-CM

## 2024-04-11 DIAGNOSIS — H52.4 PRESBYOPIA: ICD-10-CM

## 2024-04-11 DIAGNOSIS — H52.03 HYPERMETROPIA OF BOTH EYES: Primary | ICD-10-CM

## 2024-04-11 PROCEDURE — 92014 COMPRE OPH EXAM EST PT 1/>: CPT | Performed by: OPTOMETRIST

## 2024-04-11 PROCEDURE — 92015 DETERMINE REFRACTIVE STATE: CPT | Performed by: OPTOMETRIST

## 2024-04-11 ASSESSMENT — TONOMETRY
OD_IOP_MMHG: 15
IOP_METHOD: GOLDMANN APPLANATION
OS_IOP_MMHG: 16

## 2024-04-11 ASSESSMENT — VISUAL ACUITY
OS_PH_CC: 20/30
OD_PH_CC: 20/20
METHOD: SNELLEN - LINEAR
OD_CC: 20/30
OS_CC: 20/80
CORRECTION_TYPE: GLASSES

## 2024-04-11 ASSESSMENT — REFRACTION_MANIFEST
OD_AXIS: 175
OD_SPHERE: +1.00
OS_SPHERE: +1.50
OD_CYLINDER: +0.25
METHOD_AUTOREFRACTION: 1
OD_CYLINDER: +0.25
OS_AXIS: 008
OS_CYLINDER: +0.75
OS_AXIS: 010
OS_ADD: +2.50
OS_SPHERE: +1.25
OD_SPHERE: +0.75
OD_ADD: +2.50
OD_AXIS: 174
OS_CYLINDER: +0.50

## 2024-04-11 ASSESSMENT — SLIT LAMP EXAM - LIDS
COMMENTS: NORMAL
COMMENTS: NORMAL

## 2024-04-11 ASSESSMENT — CUP TO DISC RATIO
OD_RATIO: .45
OS_RATIO: .45

## 2024-04-11 ASSESSMENT — CONF VISUAL FIELD
OD_INFERIOR_NASAL_RESTRICTION: 0
OS_SUPERIOR_TEMPORAL_RESTRICTION: 0
OS_INFERIOR_NASAL_RESTRICTION: 0
OS_INFERIOR_TEMPORAL_RESTRICTION: 0
OD_NORMAL: 1
OS_SUPERIOR_NASAL_RESTRICTION: 0
METHOD: COUNTING FINGERS
OS_NORMAL: 1
OD_SUPERIOR_NASAL_RESTRICTION: 0
OD_INFERIOR_TEMPORAL_RESTRICTION: 0
OD_SUPERIOR_TEMPORAL_RESTRICTION: 0

## 2024-04-11 ASSESSMENT — REFRACTION_WEARINGRX
OS_ADD: +2.50
OS_CYLINDER: -0.75
OD_ADD: +2.50
OD_SPHERE: +0.25
OS_AXIS: 090
OD_CYLINDER: SPHER
OS_SPHERE: +0.50

## 2024-04-11 ASSESSMENT — REFRACTION
OS_CYLINDER: -0.75
OD_SPHERE: +1.25
OS_AXIS: 100
OD_AXIS: 085
OD_CYLINDER: -0.25
OD_ADD: +2.50
OS_ADD: +2.50
OS_SPHERE: +2.25

## 2024-04-11 ASSESSMENT — ENCOUNTER SYMPTOMS
HEMATOLOGIC/LYMPHATIC NEGATIVE: 0
EYES NEGATIVE: 0
NEUROLOGICAL NEGATIVE: 0
CONSTITUTIONAL NEGATIVE: 0
GASTROINTESTINAL NEGATIVE: 0
CARDIOVASCULAR NEGATIVE: 0
ENDOCRINE NEGATIVE: 0
MUSCULOSKELETAL NEGATIVE: 0
ALLERGIC/IMMUNOLOGIC NEGATIVE: 0
PSYCHIATRIC NEGATIVE: 0
RESPIRATORY NEGATIVE: 0

## 2024-04-11 ASSESSMENT — EXTERNAL EXAM - RIGHT EYE: OD_EXAM: NORMAL

## 2024-04-11 ASSESSMENT — EXTERNAL EXAM - LEFT EYE: OS_EXAM: NORMAL

## 2024-04-11 NOTE — PROGRESS NOTES
Assessment/Plan   Diagnoses and all orders for this visit:  Hypermetropia of both eyes  Regular astigmatism of both eyes  Presbyopia  New spec rx released today per patient request. Ocular health wnl for age OU. Monitor 1 year or sooner prn. Refraction billed today.    Type 2 diabetes mellitus without complication, without long-term current use of insulin (CMS/[facility])  The patient has diabetes without any evidence of retinopathy.  The patient was advised to maintain tight glucose control, tight blood pressure control, and favorable levels of cholesterol, low density lipoprotein, and high density lipoproteins.  Follow up in one year was recommended.

## 2024-04-11 NOTE — PROGRESS NOTES
Attempted two calls to the listed phone number and it just kept ringing. Review of lumbar XR shows degenerative disc disease. Message sent to schedule team to try to have her make appt with Ortho for consultation.

## 2024-04-24 ENCOUNTER — OFFICE VISIT (OUTPATIENT)
Dept: ORTHOPEDIC SURGERY | Facility: CLINIC | Age: 59
End: 2024-04-24
Payer: MEDICARE

## 2024-04-24 VITALS — HEIGHT: 69 IN | WEIGHT: 273 LBS | BODY MASS INDEX: 40.43 KG/M2

## 2024-04-24 DIAGNOSIS — M54.16 LUMBAR RADICULOPATHY: Primary | ICD-10-CM

## 2024-04-24 DIAGNOSIS — M51.36 LUMBAR DEGENERATIVE DISC DISEASE: ICD-10-CM

## 2024-04-24 PROCEDURE — 99204 OFFICE O/P NEW MOD 45 MIN: CPT | Performed by: PHYSICIAN ASSISTANT

## 2024-04-24 PROCEDURE — 99214 OFFICE O/P EST MOD 30 MIN: CPT | Performed by: PHYSICIAN ASSISTANT

## 2024-04-24 PROCEDURE — 1036F TOBACCO NON-USER: CPT | Performed by: PHYSICIAN ASSISTANT

## 2024-04-24 ASSESSMENT — PAIN - FUNCTIONAL ASSESSMENT: PAIN_FUNCTIONAL_ASSESSMENT: 0-10

## 2024-04-24 NOTE — PROGRESS NOTES
Bisi is a 58 year old female presenting to the office today with low back pain and pain down her right leg.    The pain began after she slipped and fell on black ice 2 years ago and the pain has been consistent since. The pain does radiate down her right leg with associated numbness and tingling. It is worse with prolonged standing and walking. The symptoms are better with sitting.  No weakness, dragging or tripping over her feet.  Denies bowel or bladder incontinence.    She has taken Motrin, Aleve and Tylenol for the pain and has been in physical therapy since 11/27, her most recent appointment was 3/12, she went to a in person sessions and was compliant with an at-home exercise program.  She has seen minimal improvement in her symptoms.    Family, social, and medical histories are obtained and reviewed.    ROS: All other systems have been reviewed and are negative except as previously noted in history of present illness.    Physical Exam:  Const: Well-appearing, well-nourished female in no distress.  Eyes: Normal appearing sclera and conjunctiva, no jaundice, pupils normal in appearance.  Resp: breathing comfortably, normal respiratory rate.  CV: No upper or lower extremity edema.  Musculoskeletal: Normal gait.  Lumbar ROM is supple.  Strength exam of the lower extremities reveals 5/5 strength in all major muscle groups.  Negative straight leg raise bilaterally.  Neuro: Sensation is intact and equal bilaterally. Deep tendon reflexes are normal and symmetric.  No clonus.  Skin: Intact without any lesions, normal turgor.  Psych: Alert and oriented x3, normal mood and affect.    I personally reviewed x-rays of the lumbar spine from 3/29.  There is no evidence of acute fracture or instability.  Mild degenerative changes at L5-S1.    The plan is to further evaluate her low back pain with right leg radiculopathy that is failed to improve with 4 months of physical therapy.  An MRI of the lumbar spine was ordered  today.  She will follow-up with me once the MRI is complete to discuss further treatment options, possibly including injections versus surgical intervention.    **This note was dictated using speech recognition software and was not corrected for spelling or grammatical errors**

## 2024-04-25 ENCOUNTER — DOCUMENTATION (OUTPATIENT)
Dept: PRIMARY CARE | Facility: CLINIC | Age: 59
End: 2024-04-25
Payer: MEDICARE

## 2024-04-25 ENCOUNTER — TELEPHONE (OUTPATIENT)
Dept: PRIMARY CARE | Facility: CLINIC | Age: 59
End: 2024-04-25
Payer: MEDICARE

## 2024-04-25 NOTE — TELEPHONE ENCOUNTER
Patient called and stated that she fell and believe her leg problems came from a stroke. Please give the patient a callback about this matter

## 2024-04-25 NOTE — PROGRESS NOTES
Received message that she is concerned about stroke symptoms. Called patient and she clarified that she thinks a previous stroke from 4 years ago is the source of her pain. Went to [facility] at that time. Will look into records prior to tomorrow's appointment. No symptoms at this time of CVA/TIA.

## 2024-04-26 ENCOUNTER — OFFICE VISIT (OUTPATIENT)
Dept: PRIMARY CARE | Facility: CLINIC | Age: 59
End: 2024-04-26
Payer: MEDICARE

## 2024-04-26 VITALS
WEIGHT: 272 LBS | HEIGHT: 69 IN | TEMPERATURE: 98.1 F | HEART RATE: 71 BPM | BODY MASS INDEX: 40.29 KG/M2 | DIASTOLIC BLOOD PRESSURE: 70 MMHG | SYSTOLIC BLOOD PRESSURE: 104 MMHG | OXYGEN SATURATION: 98 %

## 2024-04-26 DIAGNOSIS — M25.551 RIGHT HIP PAIN: Primary | ICD-10-CM

## 2024-04-26 DIAGNOSIS — M25.561 ACUTE PAIN OF RIGHT KNEE: ICD-10-CM

## 2024-04-26 PROCEDURE — 3074F SYST BP LT 130 MM HG: CPT | Performed by: STUDENT IN AN ORGANIZED HEALTH CARE EDUCATION/TRAINING PROGRAM

## 2024-04-26 PROCEDURE — 99214 OFFICE O/P EST MOD 30 MIN: CPT | Performed by: STUDENT IN AN ORGANIZED HEALTH CARE EDUCATION/TRAINING PROGRAM

## 2024-04-26 PROCEDURE — 3078F DIAST BP <80 MM HG: CPT | Performed by: STUDENT IN AN ORGANIZED HEALTH CARE EDUCATION/TRAINING PROGRAM

## 2024-04-26 RX ORDER — FLUPHENAZINE DECANOATE 25 MG/ML
INJECTION, SOLUTION INTRAMUSCULAR; SUBCUTANEOUS
COMMUNITY
Start: 2024-04-16

## 2024-04-26 ASSESSMENT — PATIENT HEALTH QUESTIONNAIRE - PHQ9
2. FEELING DOWN, DEPRESSED OR HOPELESS: NOT AT ALL
1. LITTLE INTEREST OR PLEASURE IN DOING THINGS: NOT AT ALL
SUM OF ALL RESPONSES TO PHQ9 QUESTIONS 1 AND 2: 0

## 2024-04-26 ASSESSMENT — PAIN SCALES - GENERAL: PAINLEVEL: 6

## 2024-04-26 ASSESSMENT — ENCOUNTER SYMPTOMS: DEPRESSION: 0

## 2024-04-26 NOTE — PROGRESS NOTES
Subjective   Patient ID: Bisi Schwarz is a 58 y.o. female who presents for Follow-up.        1. Right knee and leg pain, lower back pain   No recent falls/trauma   Walks everyday with a cane due to pain   Pain starts on her right knee and ascends to her lower back. Notices tingling/numbness to the right foot after 1 hour of activity.   Currently doing PT, has one more session left    2. Nevus on left and right upper arm   States that she noticed these moles 2 years ago and is concerned it might be cancer   Has not noticed bleeding but become itchy occasionally     3. Discolored toenails   Reports concern with her big and small toes bilaterally   Has seen podiatry who suggested she polish over them     4. Blurry vision   Has episodes x3 week and is seeing a neurologist         Review of Systems   All other systems reviewed and are negative.       Objective   /70 (BP Location: Left arm, Patient Position: Sitting)   Pulse 71   Temp 36.7 °C (98.1 °F) (Temporal)   SpO2 98%   BMI 40.17 kg/m²      Physical Exam  Vitals reviewed.   Constitutional:       Appearance: Normal appearance.   HENT:      Head: Normocephalic and atraumatic.      Mouth/Throat:      Mouth: Mucous membranes are moist.      Pharynx: Oropharynx is clear.   Eyes:      Extraocular Movements: Extraocular movements intact.      Conjunctiva/sclera: Conjunctivae normal.      Pupils: Pupils are equal, round, and reactive to light.   Cardiovascular:      Rate and Rhythm: Normal rate and regular rhythm.      Pulses: Normal pulses.      Heart sounds: Normal heart sounds.   Pulmonary:      Effort: Pulmonary effort is normal.      Breath sounds: Normal breath sounds.   Abdominal:      General: Abdomen is flat. Bowel sounds are normal.      Palpations: Abdomen is soft.   Musculoskeletal:    Tenderness at the R knee and thigh   Skin:     General: Skin is warm and dry.      Capillary Refill: Capillary refill takes less than 2 seconds.   Comments: two  nevi, one on upper right arm and one on upper left arm, symmetric, borders are defined and regular, no color variation.   Neurological:      General: No focal deficit present.      Mental Status: She is alert.   Psychiatric:         Mood and Affect: Mood normal.         Behavior: Behavior normal.     Assessment/Plan   Problem List Items Addressed This Visit     1. Knee pain   - Continue with PT and home exercises     2. Nevi   - No concerning features at this time  - Continue to monitor for changes     3. Discolored toenails   - Keep feet clean and hydrated   - Limit time with shoes on/avoid tight shoes     4. Blurry vision   - Follow up with Neurology     I saw and evaluated the patient. I personally obtained the key and critical portions of the history and physical exam or was physically present for key and critical portions performed by the medical student. I reviewed the documentation and discussed the patient with the medical student. I agree with the medical decision making as documented in the note.    Naun Baker MD

## 2024-05-07 ENCOUNTER — APPOINTMENT (OUTPATIENT)
Dept: PHYSICAL THERAPY | Facility: CLINIC | Age: 59
End: 2024-05-07
Payer: MEDICARE

## 2024-05-07 NOTE — TELEPHONE ENCOUNTER
Pt said you prescribed her vitamin D and wants to know if she should be taking it.. I told her if you advised her to she should but I would let you know anyway. Thanks!

## 2024-05-08 ENCOUNTER — HOSPITAL ENCOUNTER (OUTPATIENT)
Dept: RADIOLOGY | Facility: CLINIC | Age: 59
Discharge: HOME | End: 2024-05-08
Payer: MEDICARE

## 2024-05-08 DIAGNOSIS — M54.16 LUMBAR RADICULOPATHY: ICD-10-CM

## 2024-05-08 PROCEDURE — 72148 MRI LUMBAR SPINE W/O DYE: CPT | Performed by: RADIOLOGY

## 2024-05-08 PROCEDURE — 72148 MRI LUMBAR SPINE W/O DYE: CPT

## 2024-05-16 ENCOUNTER — TREATMENT (OUTPATIENT)
Dept: PHYSICAL THERAPY | Facility: CLINIC | Age: 59
End: 2024-05-16
Payer: MEDICARE

## 2024-05-16 DIAGNOSIS — M54.40 LOW BACK PAIN WITH SCIATICA, SCIATICA LATERALITY UNSPECIFIED, UNSPECIFIED BACK PAIN LATERALITY, UNSPECIFIED CHRONICITY: ICD-10-CM

## 2024-05-20 ENCOUNTER — EVALUATION (OUTPATIENT)
Dept: PHYSICAL THERAPY | Facility: CLINIC | Age: 59
End: 2024-05-20
Payer: MEDICARE

## 2024-05-20 DIAGNOSIS — M25.551 RIGHT HIP PAIN: ICD-10-CM

## 2024-05-20 DIAGNOSIS — M25.561 ACUTE PAIN OF RIGHT KNEE: Primary | ICD-10-CM

## 2024-05-20 PROCEDURE — 97110 THERAPEUTIC EXERCISES: CPT | Mod: GP

## 2024-05-20 PROCEDURE — 97161 PT EVAL LOW COMPLEX 20 MIN: CPT | Mod: GP

## 2024-05-20 NOTE — PROGRESS NOTES
Physical Therapy    Physical Therapy Evaluation and Treatment      Patient Name: Bisi Schwarz  MRN: [id number]  Today's Date: 5/20/2024  Time Calculation  Start Time: 1040  Stop Time: 1120  Time Calculation (min): 40 min  PT Evaluation Time Entry  PT Evaluation (Low) Time Entry: 20  PT Therapeutic Procedures Time Entry  Therapeutic Exercise Time Entry: 20  Insurance: [employer]   Visit Limit: Needs Auth  Visit: 1    Assessment:  PT Assessment  Assessment Comment: Patient presents with signs and symptoms consistent with the physician’s medical diagnosis of right knee pain. She will benefit from medically necessary skilled physical therapy interventions in order to decrease pain and improve function with daily activities.     Plan:  OP PT Plan  Treatment/Interventions: Cryotherapy, Dry needling, Education/ Instruction, Electrical stimulation, Hot pack, Mechanical traction, Neuromuscular re-education, Therapeutic activities, Therapeutic exercises  PT Plan: Skilled PT  PT Frequency: 1 time per week  Duration: 8 weeks  Certification Period Start Date: 05/20/24  Certification Period End Date: 08/18/24  Rehab Potential: Excellent  Plan of Care Agreement: Patient    Current Problem:   1. Acute pain of right knee  Referral to Physical Therapy    Follow Up In Physical Therapy      2. Right hip pain  Referral to Physical Therapy          Subjective    General:  General  Reason for Referral: right knee and low back pain  Referred By: Doc Vasquez MD  Preferred Learning Style: kinesthetic, verbal, visual, written  General Comment: Patient is a 59 y/o female who was referred to outpatient physical therapy due to right knee pain. She notes that pain started 2 years ago after she has a fall and landed on her hip and knee. Patient reports pain at rest and with motion. She notes the whole knee is painful but the pain is most severe on the medial portion. Today she rates her pain at 6/10. For pain relief she will use ice,  "Motrin, Aleve and Tylenol. She notes stairs are painful and can be difficult. Patient notes sometimes pain radiates along her hamstring and IT band.  Precautions:  Precautions  STEADI Fall Risk Score (The score of 4 or more indicates an increased risk of falling): 0  Pain:   6/10  Prior Level of Function:   Independent with all ADLs.    Objective   Cognition:   WNL  General Assessments:  Range of Motion Comments: Right knee ROM (in degrees): 0-0-130  Left knee ROM (in degrees): 0-0-130    Strength Comments: LE strength (R/L)  Hip flexion 4+/5, 4+/5  Hip extension 4+/5, 4+/5  Hip abduction 4+/5, 4+/5  Knee flexion 4/5, 5/5  Knee extension 4/5, 5/5  Ankle df 5/5, 5/5  Ankle pf 5/5, 5/5    Palpation Comment: LE strength (R/L)  Hip flexion 4+/5, 4+/5  Hip extension 4+/5, 4+/5  Hip abduction 4+/5, 4+/5  Knee flexion 4/5, 5/5  Knee extension 4/5, 5/5  Ankle df 5/5, 5/5  Ankle pf 5/5, 5/5   Special Tests Comment: Anterior drawer negative, posterior drawer negative, varus stress test negative, valgus stress test negative, edenilson’s test positive and patellar compression test positive  Functional Assessments:  Gait Comment: Patient ambulates with a normalized gait pattern.    Outcome Measures:  LEFS: 27/80    Treatments:  Therapeutic Exercise  Therapeutic Exercise Performed: Yes  Therapeutic Exercise Activity 1: hamstring stretch 3 x 30\"  Therapeutic Exercise Activity 2: calf stretch 3 x 30\"  Therapeutic Exercise Activity 3: heel slides 2 x 10 x 5\"  Therapeutic Exercise Activity 4: quad sets 2 x 10 x 5\"  Therapeutic Exercise Activity 5: SAQ 3 x 10 x 3\"  Therapeutic Exercise Activity 6: SLR 2 x 10  Therapeutic Exercise Activity 7: bike x 5'    EDUCATION:   Home exercise program once per day.     Goals:  Active       PT Problem       Patient will report compliance with her home exercise program.        Start:  11/27/23    Expected End:  02/25/24            Patient will report improvement via the MEENA to show improvements in " activity tolerance.        Start:  11/27/23    Expected End:  02/25/24            Patient will demonstrate improvements in core and hip strength to 5/5 to improve lumbopelvic stability with daily activities.        Start:  11/27/23    Expected End:  02/25/24

## 2024-05-31 ENCOUNTER — APPOINTMENT (OUTPATIENT)
Dept: PHYSICAL THERAPY | Facility: CLINIC | Age: 59
End: 2024-05-31
Payer: MEDICARE

## 2024-06-05 ENCOUNTER — OFFICE VISIT (OUTPATIENT)
Dept: NEUROLOGY | Facility: CLINIC | Age: 59
End: 2024-06-05
Payer: MEDICARE

## 2024-06-05 VITALS
DIASTOLIC BLOOD PRESSURE: 75 MMHG | HEART RATE: 58 BPM | WEIGHT: 276 LBS | SYSTOLIC BLOOD PRESSURE: 116 MMHG | RESPIRATION RATE: 16 BRPM | BODY MASS INDEX: 40.76 KG/M2

## 2024-06-05 DIAGNOSIS — G43.E09 CHRONIC MIGRAINE WITH AURA WITHOUT STATUS MIGRAINOSUS, NOT INTRACTABLE: Primary | ICD-10-CM

## 2024-06-05 PROCEDURE — 99205 OFFICE O/P NEW HI 60 MIN: CPT | Performed by: STUDENT IN AN ORGANIZED HEALTH CARE EDUCATION/TRAINING PROGRAM

## 2024-06-05 PROCEDURE — 3078F DIAST BP <80 MM HG: CPT | Performed by: STUDENT IN AN ORGANIZED HEALTH CARE EDUCATION/TRAINING PROGRAM

## 2024-06-05 PROCEDURE — 3074F SYST BP LT 130 MM HG: CPT | Performed by: STUDENT IN AN ORGANIZED HEALTH CARE EDUCATION/TRAINING PROGRAM

## 2024-06-05 RX ORDER — AMITRIPTYLINE HYDROCHLORIDE 25 MG/1
TABLET, FILM COATED ORAL
Qty: 90 TABLET | Refills: 3 | Status: SHIPPED | OUTPATIENT
Start: 2024-06-05

## 2024-06-05 ASSESSMENT — ENCOUNTER SYMPTOMS
OCCASIONAL FEELINGS OF UNSTEADINESS: 1
DEPRESSION: 0
LOSS OF SENSATION IN FEET: 0

## 2024-06-05 ASSESSMENT — PATIENT HEALTH QUESTIONNAIRE - PHQ9
SUM OF ALL RESPONSES TO PHQ9 QUESTIONS 1 AND 2: 0
1. LITTLE INTEREST OR PLEASURE IN DOING THINGS: NOT AT ALL
2. FEELING DOWN, DEPRESSED OR HOPELESS: NOT AT ALL

## 2024-06-05 ASSESSMENT — VISUAL ACUITY: VA_NORMAL: 1

## 2024-06-05 NOTE — PROGRESS NOTES
Subjective     Bisi Schwarz is a right handed  58 y.o. year old female who presents with evaluation of vertigo.  Visit type: new patient visit     HPI  She has episodes of dizziness since 2018,is like that room is moving and lasts 10 minutes and after that she has headache in the back of her head which lasts about one hour and usually takes tylenol which helps with headaches.the episodes happen 4 times per month.has nausea with episodes.and triggers with standing.light,noise and odors.Had headaches when she was young during periods.she also has headaches 3 time per month in the back and not related to dizziness and also gets nauseous.previously tried amitriptyline  and helped with SX.Stopped refill by pharmacy bcz she was no longer was visited here.  She never experienced any other neurologic Sx in the past.  Prior Hx by chart:Briefly, she noted vertiginous sensation dating back to Feb 2018 associated with some positional changes. She presented to [facility] at that time and was diagnosed with MRI negative stroke due to noted gaze evoked nystagmus. She was discharged on ASA and atorva 40.     She states she's been exercising 2x/week consisting of walking and lifting weights. Dizziness has resolved but she states she occasionally has some headache. She describes her HA as sometimes occipital, sometimes frontal and throbbing sensation. Lasts 2-3 hrs. It is sometimes proceeded by light flashing in her peripheral vision. Associated with photophobia and when she gets the HA, she goes lie down in a dark room. No nausea or vomiting. Gets 3-4 HA/week. Tylenol helps for a while. She is taking ASA but did not tolerate atorvastatin. Her PCP is following her cholesterol regimen.      No prior history of kidney stones. Mood is adequate and denies depression.       Brain MRI on 2018:CSF Spaces: The ventricles, sulci and basal cisterns are unchanged.    Parenchyma: There is no diffusion restriction abnormality to suggest  acute  infarct. There remain rare scattered punctate abnormal white  matter hyperintensities in the hemispheres on FLAIR and T2 images.  There is no mass effect or midline shift.    Contrast: There remains a dural-based uniformly enhancing rounded  lesion superficial to the left anterolateral frontal lobe. Allowing  for slight differences in interval slice position, and measuring in  similar locations the mass again measures approximately 20 mm in AP  oblique axis by 12 mm in oblique transverse by 11 mm in oblique  depth. There is no significant interval change or growth.  The enhancement pattern is otherwise within normal limits.    Paranasal Sinuses and Mastoids: Visualized paranasal sinuses and  mastoid air cells are unremarkable.    IMPRESSION:  Stable exam.  Left anterolateral frontal mass most consistent with meningioma  without significant interval change.  Interpreted within [place], [place]        Past Medical History     · History of bipolar disorder (V11.1) (Z86.59),HTN,DM Type 2,     Surgical History     · History of Biopsy Breast Open   · History of Total Abdominal Hysterectomy   · History of Varicose Vein Ligation     Family History     · Family history of pancreatic cancer in aunt(V16.0) (Z80.0)     · Family history of malignant neoplasm of breast in cousin (V16.3) (Z80.3)     Social History     ·     · Never smoker   · No alcohol use          10 point ROS reviewed and is negative except as above   Patient Health Questionnaire-2 Score: 0          Review of Systems  All other system have been reviewed and are negative for complaint.  Objective   Neurological Exam  Mental Status   Oriented to person, place and time.    Cranial Nerves  CN II: Visual acuity is normal.  CN III, IV, VI: Extraocular movements intact bilaterally.  CN V: Facial sensation is normal.  CN VII: Full and symmetric facial movement.  CN VIII: Hearing is normal.  CN IX, X: Palate elevates symmetrically  CN XI:  Shoulder shrug strength is normal.  CN XII: Tongue midline without atrophy or fasciculations.    Motor   Normal muscle tone. No abnormal involuntary movements. Strength is 5/5 throughout all four extremities.    Sensory  Light touch is normal in upper and lower extremities. Vibration is normal in upper and lower extremities.     Reflexes                                            Right                      Left  Biceps                                 1+                         1+  Patellar                                1+                         1+    Coordination  Right: Finger-to-nose normal.Left: Finger-to-nose normal.    Gait  Casual gait: Antalgic gait.  Pain in back and knees.      Physical Exam  Eyes:      Extraocular Movements: Extraocular movements intact.   Neurological:      Motor: Motor strength is normal.     Deep Tendon Reflexes:      Reflex Scores:       Bicep reflexes are 1+ on the right side and 1+ on the left side.       Patellar reflexes are 1+ on the right side and 1+ on the left side.                                       Assessment/Plan   Bisi Schwarz is a right handed  58 y.o. year old female who presents with evaluation of vertigo.She has episodes of dizziness since 2018,is like that room is moving and lasts 10 minutes and after that she has headache in the back of her head which lasts about one hour and usually takes tylenol which helps with headaches.the episodes happen 4 times per month.has nausea with episodes.and triggers with standing.light,noise and odors.Had headaches when she was young during periods.she also has headaches 3 time per month in the back and not related to dizziness and also gets nauseous.previously tried amitriptyline  and helped with SX.Brain MRI on 2018 shows some punctate abnormal white  matter hyperintensities in the hemispheres on FLAIR and T2 images.Clinical features are suggestive of vestibular Migraine we plan start Amitriptyline.First week take half  tablet in the evening and then take one tablet in the evening.

## 2024-06-05 NOTE — PATIENT INSTRUCTIONS
Your Clinical features are suggestive of vestibular Migraine we plan start Amitriptyline.  First week take half tablet in the evening and then take one tablet in the evening.

## 2024-06-14 ENCOUNTER — TELEPHONE (OUTPATIENT)
Dept: NEUROLOGY | Facility: CLINIC | Age: 59
End: 2024-06-14
Payer: MEDICARE

## 2024-06-14 NOTE — TELEPHONE ENCOUNTER
"Pt seen by SG a week ago started amitriptiline and sees that SBPs are now in the 140s for the past week. She typically runs -127. She is due to increase to a full tab, but fears her BP will continue to rise. She feels a little \"disconbobulated\" but otherwise okay- no other ilness or med changed. She can be reached at [phone] with advice to remain on 1/2 dose, increase to 25mg or discontinue. Quinton Harris    "

## 2024-06-14 NOTE — TELEPHONE ENCOUNTER
I spoke with patient.     She wants to add constipation to the list of SE and so we will stop the Elavil.    Discussed Topamax as the next step and potential S/SX reviewed and she is not sure she wants to start it. She will let me know if sx do no improve off of Elavil and if she would like to start Topamax.

## 2024-06-14 NOTE — TELEPHONE ENCOUNTER
----- Message from Roxana Gomes sent at 6/14/2024 11:05 AM EDT -----  Patient called and stated that amitriptyline (Elavil) 25 mg tablet is raising B/P ... Just started after appointment on 6/5/24... Would like to know will it level back out or does she need another type of medication

## 2024-06-21 ENCOUNTER — TELEPHONE (OUTPATIENT)
Dept: NEUROLOGY | Facility: CLINIC | Age: 59
End: 2024-06-21
Payer: MEDICARE

## 2024-06-21 NOTE — TELEPHONE ENCOUNTER
----- Message from Roxana Gomes sent at 6/21/2024 11:09 AM EDT -----  Patient does not want to take amitriptyline.... would like to know it there a mineral she can take

## 2024-06-21 NOTE — TELEPHONE ENCOUNTER
I spoke to the patient, she does not like the SE of amitriptyline. She stopped last week after discussing w DOMENIC Rendonelo and Bps have returned to normal. Topamax was also discussed, but she again does not like the SE profile for that med. Wants a Vitamin or mineral recommendation. We reviewed her labs and dont find any recent lab orders showing a deficiency. Recommended she consult her PCP to check vitamin levels before treating without knowing what her levels were. She also complains of only 1 BM per week. We discussed goal of 1 BM per day or every other and problems with medication absorption from chronic constipation. Advised her to call PCP or go to urgent care if no BM for 3-4 days. Seek out OTC laxatives recommendation from PCP, increased water intake and increased activity. She will call PCP today regarding constipation to perhaps get in to see them soon or get further recommendation.

## 2024-06-26 ENCOUNTER — OFFICE VISIT (OUTPATIENT)
Dept: ORTHOPEDIC SURGERY | Facility: CLINIC | Age: 59
End: 2024-06-26
Payer: MEDICARE

## 2024-06-26 VITALS — BODY MASS INDEX: 40.88 KG/M2 | WEIGHT: 276 LBS | HEIGHT: 69 IN

## 2024-06-26 DIAGNOSIS — M54.16 LUMBAR RADICULOPATHY: Primary | ICD-10-CM

## 2024-06-26 PROCEDURE — 1036F TOBACCO NON-USER: CPT | Performed by: PHYSICIAN ASSISTANT

## 2024-06-26 PROCEDURE — 99213 OFFICE O/P EST LOW 20 MIN: CPT | Performed by: PHYSICIAN ASSISTANT

## 2024-06-26 ASSESSMENT — PAIN - FUNCTIONAL ASSESSMENT: PAIN_FUNCTIONAL_ASSESSMENT: NO/DENIES PAIN

## 2024-06-27 ENCOUNTER — HOSPITAL ENCOUNTER (OUTPATIENT)
Dept: RADIOLOGY | Facility: HOSPITAL | Age: 59
Discharge: HOME | End: 2024-06-27
Payer: MEDICARE

## 2024-06-27 VITALS — WEIGHT: 273 LBS | BODY MASS INDEX: 40.43 KG/M2 | HEIGHT: 69 IN

## 2024-06-27 DIAGNOSIS — E11.9 TYPE 2 DIABETES MELLITUS WITHOUT COMPLICATION, WITHOUT LONG-TERM CURRENT USE OF INSULIN (MULTI): ICD-10-CM

## 2024-06-27 DIAGNOSIS — Z12.31 ENCOUNTER FOR SCREENING MAMMOGRAM FOR MALIGNANT NEOPLASM OF BREAST: ICD-10-CM

## 2024-06-27 PROCEDURE — 77067 SCR MAMMO BI INCL CAD: CPT

## 2024-06-27 RX ORDER — LANCETS 33 GAUGE
EACH MISCELLANEOUS
Qty: 100 EACH | Refills: 3 | Status: SHIPPED | OUTPATIENT
Start: 2024-06-27

## 2024-07-24 ENCOUNTER — OFFICE VISIT (OUTPATIENT)
Dept: ORTHOPEDIC SURGERY | Facility: HOSPITAL | Age: 59
End: 2024-07-24
Payer: MEDICARE

## 2024-07-24 DIAGNOSIS — M17.11 PRIMARY OSTEOARTHRITIS OF RIGHT KNEE: ICD-10-CM

## 2024-07-24 DIAGNOSIS — M76.31 ILIOTIBIAL BAND TENDONITIS, RIGHT: ICD-10-CM

## 2024-07-24 DIAGNOSIS — M76.891 HIP ABDUCTOR TENDONITIS, RIGHT: Primary | ICD-10-CM

## 2024-07-24 PROCEDURE — 99214 OFFICE O/P EST MOD 30 MIN: CPT | Performed by: FAMILY MEDICINE

## 2024-07-24 RX ORDER — NAPROXEN 500 MG/1
500 TABLET ORAL
Qty: 60 TABLET | Refills: 1 | Status: SHIPPED | OUTPATIENT
Start: 2024-07-24 | End: 2024-09-22

## 2024-07-24 NOTE — PROGRESS NOTES
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    Today, 7/24/2024, she presents with right hip and knee pain that she states has persisted since falling on black ice 2 years ago. She says the pain is in her right lateral hip and radiates down both the medial and lateral aspects of her right knee. The worst pain she endorses is to the right lateral hip. The second worst pain is in her right anterior knee. Both her right knee and hip pain get worse after walking. She sometimes notices her right knee buckling. Bisi has been using a cane to help her get around since the fall. She currently takes 400 mg Ibuprofen BID. She previously did physical therapy from November 2023 to June 2024, but has not gone after insurance no longer approved/paid for it. Over the past month, she has been doing maryuri chi, yoga, and stretching at home, but not home exercises from physical therapy. No recent injuries or trauma.She was seen by her PCP in August 2023 (telemedicine) for right knee pain managed with Voltaren. Her PCP saw her again in November 2023 for right hip pain and was referred to physical therapy. She was seen by Aminta Maya PA-C in April 2024 for low back pain radiating down her right leg, had a lumbar spine x-ray with L5-S1 degenerative changes, and an MRI with no central canal or foraminal stenosis. She was referred to pain management for possible injections (has not seen yet), as well as to sports medicine for evaluation of her right knee pain.    Bisi has no other complaints today.    Physical Examination:     Back: She has mild tenderness to palpation at the L5 level of the midline spine, as well as paraspinous muscle and soft tissue tenderness. She has tenderness to the PSIS and SI joint bilaterally. Forward flexion and back extension reproduce her lumbar back pain.    Right hip: She has full  ROM with flexion and extension. Flexion reproduces lateral hip pain that radiates down her lateral thigh. 5/5 strength with hip abduction, adduction, flexion, and extension. JONH and FADIR are negative. Log roll produces lateral hip pain. Charity test negative. She has tenderness to the right gluteus medius and right tensor fascial latae. She has tenderness to the right iliac crest. No tenderness to the right ASIS, AIIS, greater trochanter, or femur. No tenderness to the hamstrings, quadriceps, or adductor muscles.    Left hip: She has full ROM with flexion and extension without pain. She has full ROM with flexion and extension without pain. 5/5 strength with hip abduction, adduction, flexion, and extension. JONH and FADIR are negative. Log roll does not produce hip pain. Charity test negative. No tenderness to the following on the left side: gluteus medius, tensor fascial latae, iliac crest, ASIS, AIIS, greater trochanter, femur, hamstrings, quadriceps, or adductor muscles.    The RIGHT knee has trace to grade 1 joint effusion. Patella crepitus and grind are positive. There is minimal tenderness to the medial and lateral joint lines. Flexion and extension are without mechanical blocking. There is no instability with stress testing.     The LEFT knee has no joint effusion. Patella crepitus and grind are negative. There is no tenderness to the medial and lateral joint lines. Flexion and extension are without mechanical blocking. There is no instability with stress testing.     Skin - no rashes, sores, or open lesions. Strength, sensory and vascular exams are otherwise normal. There is no clubbing, cyanosis or edema.    Gait is slightly antalgic and tandem.    Imaging:  Prior radiographs of the lumbar spine, right hip, and right knee, as well as an MR without contrast of the lumbar spine were reviewed by myself today with Dr. Shah and revealed L4-L5 and L5-S1 degenerative changes of the lumbar spine and right knee  osteoarthritis. See below for formal radiology reads of these studies:    === 04/24/24 ===  MR LUMBAR SPINE WITHOUT CONTRAST  IMPRESSION:  1. Multilevel spondylosis of the lumbar spine without spinal canal  stenosis. Varying degrees of mild neural foraminal stenosis.  Signed by: Saloni Barragan 5/8/2024 2:19 PM    === 03/29/24 ===  XR PELVIS AND LEFT HIP 2 VIEWS  IMPRESSION:  1. Moderate osteoarthrosis of the bilateral hips.    Signed by: Live Ritter 3/29/2024 3:37 PM  === 03/29/24 ===  XR LUMBAR SPINE COMPLETE 4+ VIEWS  - Impression -  1. Multilevel degenerative change, predominantly facet hypertrophy,  with severe degenerative changes at L4-5 and L5-S1.  Signed by: Live Ritter 3/29/2024 3:37 PM  === 03/29/24 ===  XR RIGHT KNEE 3 VIEWS  IMPRESSION:  Mild right knee osteoarthritis.  Signed by: Live Ritter 3/29/2024 3:37 PM    Problem List Items Addressed This Visit    None  Visit Diagnoses         Codes    Hip abductor tendonitis, right    -  Primary M76.891    Relevant Medications    naproxen (Naprosyn) 500 mg tablet    Other Relevant Orders    Referral to Physical Therapy    Iliotibial band tendonitis, right     M76.31    Relevant Medications    naproxen (Naprosyn) 500 mg tablet    Other Relevant Orders    Referral to Physical Therapy    Primary osteoarthritis of right knee     M17.11    Relevant Medications    naproxen (Naprosyn) 500 mg tablet          Assessment and Plan:     We reviewed the exam and x-ray findings and discussed the conservative and surgical treatment options. We discussed that she was referred to our office today to address and evaluate her right hip and right knee specifically. We agreed that her right lateral hip pain is attributable to right hip abductor tendonitis and right IT band tendonitis, and that she does not have radiographic evidence of significant osteoarthritis of the hip. Additionally, she has primary osteoarthritis of the right knee based on her exam today and previous  radiographic evidence from March 2024. We recommended Naproxen 500 mg BID for pain management, as well as provided a referral to physical therapy to help with both her right hip and right knee pain. We also discussed the importance of following through with the plan previously provided by Aminta Maya PA-C and seeing a pain management specialist to address her low back pain and lumbar radiculopathy, as that is contributing to her pain as well, particularly the pain radiating down her right hip and leg. We will plan to follow-up in 6-8 weeks to see how she is doing.    Naun Wesley MD, [facility]  Primary Care Sports Medicine Fellow, PGY-4  [facility]    **This note was dictated using Dragon speech recognition software and was not corrected for spelling or grammatical errors**.

## 2024-07-25 ENCOUNTER — OFFICE VISIT (OUTPATIENT)
Dept: PAIN MEDICINE | Facility: HOSPITAL | Age: 59
End: 2024-07-25
Payer: MEDICARE

## 2024-07-25 DIAGNOSIS — M54.12 CERVICAL RADICULITIS: ICD-10-CM

## 2024-07-25 DIAGNOSIS — M54.16 LUMBAR RADICULOPATHY: ICD-10-CM

## 2024-07-25 PROCEDURE — 99214 OFFICE O/P EST MOD 30 MIN: CPT | Performed by: ANESTHESIOLOGY

## 2024-07-25 PROCEDURE — 99204 OFFICE O/P NEW MOD 45 MIN: CPT | Performed by: ANESTHESIOLOGY

## 2024-07-25 ASSESSMENT — PAIN SCALES - GENERAL: PAINLEVEL: 8

## 2024-07-25 NOTE — PROGRESS NOTES
Chief Complaint   Patient presents with    Back Pain    Extremity Pain     Subjective   Patient ID: Bisi Schwarz is a 58 y.o. female with a past medical history of HTN, HLD, T2DM, Hypothyroidism, Schizophrenia, presenting with low back pain and rt leg pain    HPI:   Ms. Schwarz is a new patient referred to our clinic by orthopedic team for low back pain with radicular symptoms down her right leg which started after a fall 2 years ago. Today the pain is in the lower back radiating down the front and side of her right leg, 5-6/10, aggravated by movement like walking, bending, relieved to some extent by OTC pain medications like tylenol, motrin, Aleve. She was recently prescribed Gabapentin for her pain which she has yet to start. Denies any sensory or motor deficits or any bowel/bladder incontinence. She has participated in PT and continues home exercises which offer some relief. She endorses new onset right side upper extremity weakness along with a prolonged h/o falls.     The patient has done prior formal physical therapy which she started in November 2023 and her most recent appointment was in May.  She has also seen spine surgery for evaluation last month who referred her for further conservative measures.    Physical Therapy: The patient has done six or more weeks of physical therapy in the past six months with some improvement    Other Conservative Measures she has tried: Ice  Classes of medications tried in the past: Acetaminophen, NSAIDs, and Topical agents    Last Urine Drug Screen:  No results found for this or any previous visit (from the past 8760 hour(s)).  N/A      Review of Systems   13-point ROS done and negative except for HPI.     Current Outpatient Medications   Medication Instructions    albuterol (Proventil HFA) 90 mcg/actuation inhaler 2 puffs, inhalation, Every 4 hours PRN    amitriptyline (Elavil) 25 mg tablet First week Take half tablet in the evening and then take one tablet     "atorvastatin (LIPITOR) 20 mg, oral, Daily    calcium carbonate-vitamin D3 (Oyster Shell Calcium-Vit D3) 500 mg-5 mcg (200 unit) tablet 1 tablet, oral, Daily    diclofenac sodium (Voltaren) 1 % gel gel 1 Application, Topical, 4 times daily    dulaglutide (Trulicity) 0.75 mg/0.5 mL pen injector INJECT ONCE WEEKLY SUBCUTANEOUSLY    fluPHENAZine decanoate (Prolixin) 25 mg/mL injection Inject 1 ml... twenty five (25) milligrams every 4 weeks Indications schizophrenia    hydroCHLOROthiazide (HYDRODIURIL) 25 mg, oral, Daily    lancets (OneTouch Delica Plus Lancet) 33 gauge misc USE 3 TIMES DAILY BEFORE MEALS.    levothyroxine (SYNTHROID, LEVOXYL) 112 mcg, oral, Daily    naproxen (NAPROSYN) 500 mg, oral, 2 times daily (morning and late afternoon)       Past Medical History:   Diagnosis Date    Personal history of other mental and behavioral disorders     History of bipolar disorder        Past Surgical History:   Procedure Laterality Date    BREAST BIOPSY  03/18/2014    Biopsy Breast Open    TOTAL ABDOMINAL HYSTERECTOMY  03/18/2014    Total Abdominal Hysterectomy    VARICOSE VEIN SURGERY  09/29/2014    Varicose Vein Ligation        Family History   Problem Relation Name Age of Onset    Breast cancer Mother's Sister      Breast cancer Father's Sister          Allergies   Allergen Reactions    Penicillins Dizziness and Hives     States \"falls out\"    Other reaction(s): Dizziness, Mental Status Change   States \"falls out\"    Pork Derived (Porcine) Headache    Pork/Porcine Containing Products Unknown     Other Reaction(s): Intolerance    Sulfur Hives    Codeine Diarrhea, Hives and Rash    Sulfa (Sulfonamide Antibiotics) Diarrhea, Rash, Hives and Dizziness        Objective     There were no vitals filed for this visit.     Physical Exam  General: NAD, well groomed, well nourished  Eyes: Non-icteric sclera, EOMI  Ears, Nose, Mouth, and Throat: External ears and nose appear to be without deformity or rash. No lesions or masses " noted. Hearing is grossly intact.   Neck: Trachea midline  Respiratory: Nonlabored breathing   Cardiovascular: no peripheral edema   Skin: No rashes or open lesions/ulcers identified on skin.    Back:   Palpation: Mild tenderness to palpation over lumbar spine and paraspinous muscles.   Straight leg raise: does reproduce their pain, positive at 35 degrees on the right   JONH Maneuver does not reproduce pain bilaterally    B/l Hip: No pain over greater trochanters. and Pain not reproduced with hip internal/external rotation.     Neurologic:   Cranial nerves grossly intact.   Strength B/l lower extremity 5/5 and symmetric plantar/dorsiflexion   Sensation: Normal to light touch throughout, pinprick intact throughout.    Psychiatric: Alert, orientation to person, place, and time. Cooperative.    Imaging personally reviewed and independently interpreted:   MRI lumbar spine (5/8/2024)  FINDINGS:  There are 5 non-rib-bearing lumbar vertebra.       images demonstrate minimal levoscoliosis centered around L3.  Normal lordotic lumbar curvature.      The vertebral body heights are well-maintained. There is no  hyperintensity on STIR imaging to suggest bone marrow edema. Numerous  T1 and T2 hyperintense lesions throughout the thoracolumbar spine  most prominent at T12, L1, L3 and L4 may represent typical vertebral  hemangiomas versus focal fatty infiltration.      The vertebral disc heights are well-maintained. There is decreased  disc signal within the nucleus pulposus at levels L4-L5 and L5-S1. No  significant loss of disc height.      The conus medullaris terminates at the superior endplate of L1. There  is no abnormal signal within the imaged spinal cord.      At the T12/L1 level, there is bilateral ligamentum flavum thickening  and mild degenerative facet changes bilaterally without central canal  or neural foraminal stenosis.      At the L1/L2 level, there is bilateral ligamentum flavum thickening  and mild  degenerative facet changes bilaterally without central canal  or neural foraminal stenosis.      At the L2/L3 level, there is disc bulge and moderate degenerative  changes in the bilateral facet joints without central canal or neural  foraminal stenosis.      At the L3/L4 level, there is disc bulge and moderate degenerative  changes in the bilateral facet joints with mild T2 signal intensity  suggestive of edema in the left facet joint causing mild bilateral  neural foraminal stenosis. There is no significant central canal  stenosis.      At the L4/L5 level, there is disc bulge and severe degenerative  changes in the bilateral facet joints. There is increased T2 signal  intensity in the right-greater-than-left facet joints causing mild  neural foraminal stenosis. There is no significant central canal  stenosis.      At the L5/S1 level, there is severe degenerative changes of the  bilateral facet joints causing mild bilateral neural foraminal  stenosis. No central canal stenosis      There is are numerous cystic lesions in the left kidney largest  measuring up to 1.2 cm, better assessed on prior MRI abdomen dated  09/14/2021.      IMPRESSION:  1. Multilevel spondylosis of the lumbar spine without spinal canal  stenosis. Varying degrees of mild neural foraminal stenosis.    Assessment/Plan   Bisi Schwarz is a 58 y.o. female with a past medical history of HTN, HLD, T2DM, Hypothyroidism, Schizophrenia, presenting with low back pain and right leg pain. Her history and examination are suggestive of lumbar radiculitis at L3-L4-L5 dermatomal distribution. Most recent MRI in May 2024 showed multilevel spondylosis of the lumbar spine without spinal canal stenosis and varying degrees of mild neural foraminal stenosis. Given her history and exam findings showing upper extremity weakness and repeated falls, we recommend a cervical MRI to evaluate for compression higher in the spine. Will consider EMG for her right lower  extremity as needed in the future with possible injections.    Plan:  - Continue PT.  - Cervical MRI spine for objective weakness in her right upper extremity in addition to her complaints of imbalance and repeated falls.  - Will consider EMG as needed after the results of the cervical MRI.    Follow up: As needed     The patient was invited to contact us back anytime with any questions or concerns and follow-up with us in the office as needed.     Diagnoses and all orders for this visit:  Lumbar radiculopathy  -     Referral to Pain Medicine  Cervical radiculitis  -     MR cervical spine wo IV contrast; Future      This note was generated with the aid of dictation software, there may be typos despite my attempts at proofreading.     Juana Fajardo  PGY-2  Anesthesiology

## 2024-08-08 ENCOUNTER — APPOINTMENT (OUTPATIENT)
Dept: RADIOLOGY | Facility: CLINIC | Age: 59
End: 2024-08-08
Payer: MEDICARE

## 2024-08-08 ENCOUNTER — CLINICAL SUPPORT (OUTPATIENT)
Dept: PHYSICAL THERAPY | Facility: CLINIC | Age: 59
End: 2024-08-08
Payer: MEDICARE

## 2024-08-08 DIAGNOSIS — M54.40 LOW BACK PAIN WITH SCIATICA, SCIATICA LATERALITY UNSPECIFIED, UNSPECIFIED BACK PAIN LATERALITY, UNSPECIFIED CHRONICITY: ICD-10-CM

## 2024-08-08 PROCEDURE — 97110 THERAPEUTIC EXERCISES: CPT | Mod: GP

## 2024-08-08 NOTE — PROGRESS NOTES
"Physical Therapy    Physical Therapy Treatment    Patient Name: Bisi Schwarz  MRN: [id number]  Today's Date: 8/8/2024    Time Entry:   Time Calculation  Start Time: 1335  Stop Time: 1420  Time Calculation (min): 45 min     PT Therapeutic Procedures Time Entry  Therapeutic Exercise Time Entry: 45  Visit: 2    Assessment:  PT Assessment  Assessment Comment: The patient tolerated the therapeutic exerise well this session.  Plan:  OP PT Plan  Treatment/Interventions: Cryotherapy, Dry needling, Education/ Instruction, Electrical stimulation, Hot pack, Mechanical traction, Neuromuscular re-education, Therapeutic activities, Therapeutic exercises  PT Plan: Skilled PT  PT Frequency: 1 time per week  Duration: 8 weeks  Certification Period Start Date: 05/20/24  Certification Period End Date: 08/18/24  Rehab Potential: Excellent  Plan of Care Agreement: Patient    Current Problem  Problem List Items Addressed This Visit             ICD-10-CM    Low back pain M54.50         Subjective:    General  Reason for Referral: right knee and low back pain  Referred By: Doc Vasquez MD  Preferred Learning Style: kinesthetic, verbal, visual, written  General Comment: Patient presents with 5/10 right hip and right knee pain. Hip pain is lateral and knee pain is anterior. States she has pain with weight bearing activity.     Objective     R knee ROM: 0-0-110  R hip ROM:  Flexion 100  Extension 30  Abduction 35  External Rotation 35  Internal Rotation 15    R LE strength  Knee flexion 4+/5  Knee extension 4+/5  Hip Flexion 4/5  Hip Extension 4/5  Hip Abduction 4/5  Hip External Rotation 4/5  Hip Internal  Rotation 4/5    Charity's negative  Anjum test positive  Hip scouring negative    Patient ambulates with a normalized gait pattern.     Treatments:  Therapeutic Exercise  Therapeutic Exercise Performed: Yes  Therapeutic Exercise Activity 1: hamstring stretch 3 x 30\"  Therapeutic Exercise Activity 2: calf stretch 3 x 30\"  Therapeutic " "Exercise Activity 3: heel slides 2 x 10 x 5\"  Therapeutic Exercise Activity 4: quad sets 2 x 10 x 5\"  Therapeutic Exercise Activity 5: SAQ 3 x 10 x 3\"  Therapeutic Exercise Activity 6: SLR 2 x 10  Therapeutic Exercise Activity 7: bike x 5'  Therapeutic Exercise Activity 8: hook lying hip adduction isometrics 3 x 10 x 5\"  Therapeutic Exercise Activity 9: hook lying clamshells green TB 3 x 10  Therapeutic Exercise Activity 10: swiss ball DKTC 20 x 3\"  Therapeutic Exercise Activity 11: bridge 2 x 10 x 3:  Therapeutic Exercise Activity 12: hip flexor stretch 3 x 30\"  Therapeutic Exercise Activity 13: piriformis stretch 3 x 30\"  Therapeutic Exercise Activity 14: leg press 80# 3 x 10    OP EDUCATION:   Home exercise program once per day.     Goals:  Active       PT Problem       Patient will report compliance with her home exercise program.        Start:  11/27/23    Expected End:  02/25/24            Patient will report improvement via the MEENA to show improvements in activity tolerance.        Start:  11/27/23    Expected End:  02/25/24            Patient will demonstrate improvements in core and hip strength to 5/5 to improve lumbopelvic stability with daily activities.        Start:  11/27/23    Expected End:  02/25/24              "

## 2024-08-09 ENCOUNTER — APPOINTMENT (OUTPATIENT)
Dept: PRIMARY CARE | Facility: CLINIC | Age: 59
End: 2024-08-09
Payer: MEDICARE

## 2024-08-09 ENCOUNTER — LAB (OUTPATIENT)
Dept: LAB | Facility: LAB | Age: 59
End: 2024-08-09
Payer: MEDICARE

## 2024-08-09 VITALS
WEIGHT: 274 LBS | HEIGHT: 69 IN | HEART RATE: 62 BPM | BODY MASS INDEX: 40.58 KG/M2 | DIASTOLIC BLOOD PRESSURE: 76 MMHG | SYSTOLIC BLOOD PRESSURE: 117 MMHG | TEMPERATURE: 97.8 F | OXYGEN SATURATION: 97 %

## 2024-08-09 DIAGNOSIS — K59.00 CONSTIPATION, UNSPECIFIED CONSTIPATION TYPE: ICD-10-CM

## 2024-08-09 DIAGNOSIS — E11.9 TYPE 2 DIABETES MELLITUS WITHOUT COMPLICATION, WITHOUT LONG-TERM CURRENT USE OF INSULIN (MULTI): ICD-10-CM

## 2024-08-09 DIAGNOSIS — G43.909 MIGRAINE WITHOUT STATUS MIGRAINOSUS, NOT INTRACTABLE, UNSPECIFIED MIGRAINE TYPE: Primary | ICD-10-CM

## 2024-08-09 LAB
EST. AVERAGE GLUCOSE BLD GHB EST-MCNC: 117 MG/DL
HBA1C MFR BLD: 5.7 %

## 2024-08-09 PROCEDURE — 3078F DIAST BP <80 MM HG: CPT | Performed by: STUDENT IN AN ORGANIZED HEALTH CARE EDUCATION/TRAINING PROGRAM

## 2024-08-09 PROCEDURE — 99213 OFFICE O/P EST LOW 20 MIN: CPT | Performed by: STUDENT IN AN ORGANIZED HEALTH CARE EDUCATION/TRAINING PROGRAM

## 2024-08-09 PROCEDURE — 36415 COLL VENOUS BLD VENIPUNCTURE: CPT

## 2024-08-09 PROCEDURE — 3044F HG A1C LEVEL LT 7.0%: CPT | Performed by: STUDENT IN AN ORGANIZED HEALTH CARE EDUCATION/TRAINING PROGRAM

## 2024-08-09 PROCEDURE — 3008F BODY MASS INDEX DOCD: CPT | Performed by: STUDENT IN AN ORGANIZED HEALTH CARE EDUCATION/TRAINING PROGRAM

## 2024-08-09 PROCEDURE — 3074F SYST BP LT 130 MM HG: CPT | Performed by: STUDENT IN AN ORGANIZED HEALTH CARE EDUCATION/TRAINING PROGRAM

## 2024-08-09 PROCEDURE — 83036 HEMOGLOBIN GLYCOSYLATED A1C: CPT

## 2024-08-09 RX ORDER — DOCUSATE SODIUM 100 MG/1
100 CAPSULE, LIQUID FILLED ORAL 2 TIMES DAILY
Qty: 60 CAPSULE | Refills: 0 | Status: SHIPPED | OUTPATIENT
Start: 2024-08-09

## 2024-08-09 RX ORDER — TOPIRAMATE 25 MG/1
25 TABLET ORAL DAILY
Qty: 30 TABLET | Refills: 11 | Status: SHIPPED | OUTPATIENT
Start: 2024-08-09 | End: 2025-08-09

## 2024-08-09 RX ORDER — POLYETHYLENE GLYCOL 3350 17 G/17G
8.5 POWDER, FOR SOLUTION ORAL DAILY
Qty: 30 EACH | Refills: 5 | Status: SHIPPED | OUTPATIENT
Start: 2024-08-09 | End: 2025-08-04

## 2024-08-09 ASSESSMENT — PAIN SCALES - GENERAL: PAINLEVEL: 4

## 2024-08-09 NOTE — PROGRESS NOTES
"  Subjective   Patient ID: Bisi Schwarz is a 58 y.o. female who presents for Follow-up.     Interval changes:  - Last visit seen about back and hip pain. Has seen PM since then; they have ordered MRI cervical spine. Considering EMG. Rec PT, started yesterday.  - Neurology treating vestibular migraine with amitriptyline. But brought BP up high.    1. DM2  5.7% most recently  Not onlmedications or insulin  Only lifestyle modifcations    2. Constipation  Bowel movements every other day  Hard most of the time  Trying to drink lots of water  Caffeine intake: 1-3 cups, including cans of soda    3. Vestibular migraine  Neurology treating vestibular migraine with amitriptyline.  Patient feels that it brought BP up high.          Review of Systems   All other systems reviewed and are negative.      Objective   /76 (BP Location: Left arm, Patient Position: Sitting)   Pulse 62   Temp 36.6 °C (97.8 °F) (Oral)   Ht 1.753 m (5' 9\")   Wt 124 kg (274 lb)   LMP  (LMP Unknown)   SpO2 97%   BMI 40.46 kg/m²     Physical Exam  Vitals reviewed.   Constitutional:       Appearance: Normal appearance.   HENT:      Head: Normocephalic and atraumatic.      Mouth/Throat:      Mouth: Mucous membranes are moist.      Pharynx: Oropharynx is clear.   Eyes:      Extraocular Movements: Extraocular movements intact.      Conjunctiva/sclera: Conjunctivae normal.      Pupils: Pupils are equal, round, and reactive to light.   Cardiovascular:      Rate and Rhythm: Normal rate and regular rhythm.      Pulses: Normal pulses.      Heart sounds: Normal heart sounds.   Pulmonary:      Effort: Pulmonary effort is normal.      Breath sounds: Normal breath sounds.   Abdominal:      General: Abdomen is flat. Bowel sounds are normal.      Palpations: Abdomen is soft.   Musculoskeletal:         General: Normal range of motion.      Cervical back: Normal range of motion and neck supple.   Skin:     General: Skin is warm and dry.      Capillary " Refill: Capillary refill takes less than 2 seconds.   Neurological:      General: No focal deficit present.      Mental Status: She is alert.   Psychiatric:         Mood and Affect: Mood normal.         Behavior: Behavior normal.         Assessment/Plan   Problem List Items Addressed This Visit             ICD-10-CM    Diabetes mellitus (Multi) E11.9    Relevant Orders    Hemoglobin A1c (Completed)    Migraine headache - Primary G43.909    Relevant Medications    topiramate (Topamax) 25 mg tablet     Other Visit Diagnoses         Codes    Constipation, unspecified constipation type     K59.00    Relevant Medications    polyethylene glycol (Glycolax, Miralax) 17 gram packet    docusate sodium (Colace) 100 mg capsule                   OVERALL PLAN:  [ ] trial of topamax in place of amitriptyline  [ ] miralax, colace  [ ] Hgb A1c repeat    Note: This documentaion was entered into the electronic medical record using [employer] medical dictation software, and was not necessarily edited thereafter. Please excuse any errors of spelling or grammar.

## 2024-08-15 ENCOUNTER — TREATMENT (OUTPATIENT)
Dept: PHYSICAL THERAPY | Facility: CLINIC | Age: 59
End: 2024-08-15
Payer: MEDICARE

## 2024-08-15 DIAGNOSIS — M25.561 ACUTE PAIN OF RIGHT KNEE: ICD-10-CM

## 2024-08-15 PROCEDURE — 97110 THERAPEUTIC EXERCISES: CPT | Mod: GP

## 2024-08-15 NOTE — PROGRESS NOTES
"Physical Therapy    Physical Therapy Treatment    Patient Name: Bisi Schwarz  MRN: [id number]  Today's Date: 8/15/2024    Time Entry:   Time Calculation  Start Time: 0940  Stop Time: 1025  Time Calculation (min): 45 min     PT Therapeutic Procedures Time Entry  Therapeutic Exercise Time Entry: 45  Visit: 2    Assessment:  PT Assessment  Assessment Comment: The patient tolerated the therapeutic exerise well this session.  Plan:  OP PT Plan  Treatment/Interventions: Cryotherapy, Dry needling, Education/ Instruction, Electrical stimulation, Hot pack, Mechanical traction, Neuromuscular re-education, Therapeutic activities, Therapeutic exercises  PT Plan: Skilled PT  PT Frequency: 1 time per week  Duration: 8 weeks  Certification Period Start Date: 05/20/24  Certification Period End Date: 08/18/24  Rehab Potential: Excellent  Plan of Care Agreement: Patient    Current Problem  Problem List Items Addressed This Visit    None  Visit Diagnoses         Codes    Acute pain of right knee     M25.561              Subjective:   General  Reason for Referral: right knee and low back pain  Referred By: Doc Vasquez MD  Preferred Learning Style: kinesthetic, verbal, visual, written  General Comment: Patient reports compliance with her home exercise program.    Objective     Treatments:  Therapeutic Exercise  Therapeutic Exercise Performed: Yes  Therapeutic Exercise Activity 1: hamstring stretch 3 x 30\"  Therapeutic Exercise Activity 2: calf stretch 3 x 30\"  Therapeutic Exercise Activity 3: heel slides 2 x 10 x 5\"  Therapeutic Exercise Activity 4: quad sets 2 x 10 x 5\"  Therapeutic Exercise Activity 5: SAQ 3 x 10 x 3\"  Therapeutic Exercise Activity 6: SLR 2 x 10  Therapeutic Exercise Activity 7: bike x 5'  Therapeutic Exercise Activity 8: hook lying hip adduction isometrics 3 x 10 x 5\"  Therapeutic Exercise Activity 9: hook lying clamshells green TB 3 x 10  Therapeutic Exercise Activity 10: swiss ball DKTC 20 x 3\"  Therapeutic " "Exercise Activity 11: bridge 2 x 10 x 3\"  Therapeutic Exercise Activity 12: hip flexor stretch 3 x 30\"  Therapeutic Exercise Activity 13: piriformis stretch 3 x 30\"  Therapeutic Exercise Activity 14: leg press 80# 3 x 10    Goals:  Active       PT Problem       Patient will report compliance with her home exercise program.        Start:  11/27/23    Expected End:  02/25/24            Patient will report improvement via the MEENA to show improvements in activity tolerance.        Start:  11/27/23    Expected End:  02/25/24            Patient will demonstrate improvements in core and hip strength to 5/5 to improve lumbopelvic stability with daily activities.        Start:  11/27/23    Expected End:  02/25/24              "

## 2024-08-30 ENCOUNTER — HOSPITAL ENCOUNTER (OUTPATIENT)
Dept: RADIOLOGY | Facility: CLINIC | Age: 59
Discharge: HOME | End: 2024-08-30
Payer: MEDICARE

## 2024-08-30 ENCOUNTER — TREATMENT (OUTPATIENT)
Dept: PHYSICAL THERAPY | Facility: CLINIC | Age: 59
End: 2024-08-30
Payer: MEDICARE

## 2024-08-30 DIAGNOSIS — M25.561 ACUTE PAIN OF RIGHT KNEE: ICD-10-CM

## 2024-08-30 DIAGNOSIS — M54.12 CERVICAL RADICULITIS: ICD-10-CM

## 2024-08-30 PROCEDURE — 97110 THERAPEUTIC EXERCISES: CPT | Mod: GP

## 2024-08-30 PROCEDURE — 72141 MRI NECK SPINE W/O DYE: CPT

## 2024-08-30 NOTE — PROGRESS NOTES
"Physical Therapy    Physical Therapy Treatment    Patient Name: Bisi Schwarz  MRN: [id number]  Today's Date: 8/30/2024    Time Entry:   Time Calculation  Start Time: 0945  Stop Time: 1030  Time Calculation (min): 45 min     PT Therapeutic Procedures Time Entry  Therapeutic Exercise Time Entry: 45  Visit: 3    Assessment:  PT Assessment  Assessment Comment: The patient tolerated the therapeutic exerise well this session.  Plan:  OP PT Plan  Treatment/Interventions: Cryotherapy, Dry needling, Education/ Instruction, Electrical stimulation, Hot pack, Mechanical traction, Neuromuscular re-education, Therapeutic activities, Therapeutic exercises  PT Plan: Skilled PT  PT Frequency: 1 time per week  Duration: 8 weeks  Certification Period Start Date: 05/20/24  Certification Period End Date: 08/18/24  Rehab Potential: Excellent  Plan of Care Agreement: Patient    Current Problem  Problem List Items Addressed This Visit    None  Visit Diagnoses         Codes    Acute pain of right knee     M25.561              Subjective:   General  Reason for Referral: right knee and low back pain  Referred By: Doc Vasquez MD  Preferred Learning Style: kinesthetic, verbal, visual, written  General Comment: Patient reports compliance with her home exercise program.    Objective     Treatments:  Therapeutic Exercise  Therapeutic Exercise Performed: Yes  Therapeutic Exercise Activity 1: hamstring stretch 3 x 30\"  Therapeutic Exercise Activity 2: calf stretch 3 x 30\"  Therapeutic Exercise Activity 3: heel slides 2 x 10 x 5\"  Therapeutic Exercise Activity 4: quad sets 2 x 10 x 5\"  Therapeutic Exercise Activity 5: SAQ 3 x 10 x 3\"  Therapeutic Exercise Activity 6: SLR 2 x 10  Therapeutic Exercise Activity 7: bike x 5'  Therapeutic Exercise Activity 8: hook lying hip adduction isometrics 3 x 10 x 5\"  Therapeutic Exercise Activity 9: hook lying clamshells green TB 3 x 10  Therapeutic Exercise Activity 10: swiss ball DKTC 20 x 3\"  Therapeutic " "Exercise Activity 11: bridge 2 x 10 x 3\"  Therapeutic Exercise Activity 12: hip flexor stretch 3 x 30\"  Therapeutic Exercise Activity 13: piriformis stretch 3 x 30\"  Therapeutic Exercise Activity 14: leg press 80# 3 x 10  Therapeutic Exercise Activity 15: knee flexion stretch on step 10 x 5\"    Goals:  Active       PT Problem       Patient will report compliance with her home exercise program.        Start:  11/27/23    Expected End:  02/25/24            Patient will report improvement via the MEENA to show improvements in activity tolerance.        Start:  11/27/23    Expected End:  02/25/24            Patient will demonstrate improvements in core and hip strength to 5/5 to improve lumbopelvic stability with daily activities.        Start:  11/27/23    Expected End:  02/25/24              "

## 2024-09-05 DIAGNOSIS — M54.12 CERVICAL RADICULITIS: ICD-10-CM

## 2024-09-09 ENCOUNTER — TELEPHONE (OUTPATIENT)
Dept: ORTHOPEDIC SURGERY | Facility: HOSPITAL | Age: 59
End: 2024-09-09
Payer: MEDICARE

## 2024-09-09 DIAGNOSIS — M76.891 HIP ABDUCTOR TENDONITIS, RIGHT: Primary | ICD-10-CM

## 2024-09-09 RX ORDER — MELOXICAM 15 MG/1
15 TABLET ORAL DAILY
Qty: 30 TABLET | Refills: 1 | Status: SHIPPED | OUTPATIENT
Start: 2024-09-09 | End: 2024-11-08

## 2024-09-09 NOTE — TELEPHONE ENCOUNTER
Spoke with patient she did say that a slight rash broke out on her chest as well so I added it in her allergy list as not able to use

## 2024-09-09 NOTE — TELEPHONE ENCOUNTER
----- Message from Albert Shah sent at 9/9/2024  2:59 PM EDT -----  Regarding: RE: Medication  Can tell her that is not an allergy and we will switch her to meloxicam  ----- Message -----  From: Penny Goncalves CMA  Sent: 9/9/2024   2:43 PM EDT  To: Albert Shah MD  Subject: Medication                                       Patient sts that the Naprosyn is making her nauseated and she is allergic to them. She had been taking them for a little less then a mos now? Can you try to give her something else?  Or should I let her know this is not really an allergy?

## 2024-09-12 ENCOUNTER — TREATMENT (OUTPATIENT)
Dept: PHYSICAL THERAPY | Facility: CLINIC | Age: 59
End: 2024-09-12
Payer: MEDICARE

## 2024-09-12 DIAGNOSIS — M25.561 ACUTE PAIN OF RIGHT KNEE: ICD-10-CM

## 2024-09-12 PROCEDURE — 97110 THERAPEUTIC EXERCISES: CPT | Mod: GP

## 2024-09-12 NOTE — PROGRESS NOTES
"Physical Therapy    Physical Therapy Treatment    Patient Name: Bisi Schwarz  MRN: [id number]  Today's Date: 9/12/2024    Time Entry:   Time Calculation  Start Time: 1430  Stop Time: 1515  Time Calculation (min): 45 min     PT Therapeutic Procedures Time Entry  Therapeutic Exercise Time Entry: 45  Visit: 4    Assessment:  PT Assessment  Assessment Comment: The patient tolerated the therapeutic exerise well this session.  Plan:  OP PT Plan  Treatment/Interventions: Cryotherapy, Dry needling, Education/ Instruction, Electrical stimulation, Hot pack, Mechanical traction, Neuromuscular re-education, Therapeutic activities, Therapeutic exercises  PT Plan: Skilled PT  PT Frequency: 1 time per week  Duration: 8 weeks  Certification Period Start Date: 05/20/24  Certification Period End Date: 08/18/24  Rehab Potential: Excellent  Plan of Care Agreement: Patient    Current Problem  Problem List Items Addressed This Visit    None  Visit Diagnoses         Codes    Acute pain of right knee     M25.561              Subjective:   General  Reason for Referral: right knee and low back pain  Referred By: Doc Vasquez MD  Preferred Learning Style: kinesthetic, verbal, visual, written  General Comment: Patient reports compliance with her home exercise program.    Objective     Treatments:  Therapeutic Exercise  Therapeutic Exercise Performed: Yes  Therapeutic Exercise Activity 1: hamstring stretch 3 x 30\"  Therapeutic Exercise Activity 2: calf stretch 3 x 30\"  Therapeutic Exercise Activity 3: heel slides 2 x 10 x 5\"  Therapeutic Exercise Activity 4: quad sets 2 x 10 x 5\"  Therapeutic Exercise Activity 5: SAQ 3 x 10 x 3\"  Therapeutic Exercise Activity 6: SLR 2 x 10  Therapeutic Exercise Activity 7: bike x 10'  Therapeutic Exercise Activity 8: hook lying hip adduction isometrics 3 x 10 x 5\"  Therapeutic Exercise Activity 9: hook lying clamshells green TB 3 x 10  Therapeutic Exercise Activity 10: swiss ball DKTC 20 x 3\"  Therapeutic " "Exercise Activity 11: bridge 2 x 10 x 3\"  Therapeutic Exercise Activity 12: hip flexor stretch 3 x 30\"  Therapeutic Exercise Activity 13: piriformis stretch 3 x 30\"  Therapeutic Exercise Activity 14: leg press 80# 3 x 10  Therapeutic Exercise Activity 15: knee flexion stretch on step 10 x 5\"    Goals:  Active       PT Problem       Patient will report compliance with her home exercise program.        Start:  11/27/23    Expected End:  02/25/24            Patient will report improvement via the MEENA to show improvements in activity tolerance.        Start:  11/27/23    Expected End:  02/25/24            Patient will demonstrate improvements in core and hip strength to 5/5 to improve lumbopelvic stability with daily activities.        Start:  11/27/23    Expected End:  02/25/24              "

## 2024-09-18 ENCOUNTER — OFFICE VISIT (OUTPATIENT)
Dept: ORTHOPEDIC SURGERY | Facility: HOSPITAL | Age: 59
End: 2024-09-18
Payer: MEDICARE

## 2024-09-18 DIAGNOSIS — M17.11 PRIMARY OSTEOARTHRITIS OF RIGHT KNEE: ICD-10-CM

## 2024-09-18 DIAGNOSIS — M76.31 ILIOTIBIAL BAND TENDONITIS, RIGHT: ICD-10-CM

## 2024-09-18 DIAGNOSIS — M76.891 HIP ABDUCTOR TENDONITIS, RIGHT: Primary | ICD-10-CM

## 2024-09-18 PROCEDURE — 99213 OFFICE O/P EST LOW 20 MIN: CPT | Performed by: FAMILY MEDICINE

## 2024-09-18 NOTE — PROGRESS NOTES
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    On 7/24/2024, she presents with right hip and knee pain that she states has persisted since [place] 2 years ago. She says the pain is in her right lateral hip and radiates down both the medial and lateral aspects of her right knee. The worst pain she endorses is to the right lateral hip. The second worst pain is in her right anterior knee. Both her right knee and hip pain get worse after walking. She sometimes notices her right knee buckling. Bisi has been using a cane to help her get around since the fall. She currently takes 400 mg Ibuprofen BID. She previously did physical therapy from November 2023 to June 2024, but has not gone after insurance no longer approved/paid for it. Over the past month, she has been doing maryuri chi, yoga, and stretching at home, but not home exercises from physical therapy. No recent injuries or trauma.She was seen by her PCP in August 2023 (telemedicine) for right knee pain managed with Voltaren. Her PCP saw her again in November 2023 for right hip pain and was referred to physical therapy. She was seen by Aminta Maya PA-C in April 2024 for low back pain radiating down her right leg, had a lumbar spine x-ray with L5-S1 degenerative changes, and an MRI with no central canal or foraminal stenosis. She was referred to pain management for possible injections (has not seen yet), as well as to sports medicine for evaluation of her right knee pain.    On 9/18/2024, she presents for follow-up. Her right hip pain and right knee pain are both 70-75% better. She no longer has pain to her right lateral thigh. She still has mild pain to her right lateral hip and buttock, but it has improved. She had tried taking Naproxen as previously discussed but developed a rash, so was switched to meloxicam, which she started  yesterday and feels like is helpful. She has done 3 physical therapy visits since her last appointment and is also doing home exercises regularly for both her right hip and knee. Overall, she feels like she is able to walk more than she was able to before but still uses a cane to help her get around. She has been seeing pain management for her spine.    Bisi has no other complaints today.    Physical Examination:     Back: She has mild tenderness to palpation at the L5 level of the midline spine, as well as paraspinous muscle and soft tissue tenderness. She has tenderness to the PSIS and SI joint bilaterally. Forward flexion and back extension reproduce her lumbar back pain.    Right hip: She has full ROM with flexion and extension. Mild non-radiating right hip pain elicited with hip flexion. 5/5 strength with hip abduction, adduction, flexion, and extension. JONH and FADIR are negative. Log roll is negative. Charity test negative. She has tenderness to the right gluteus medius. No tenderness to the right iliac crest, ASIS, AIIS, greater trochanter, or femur. No tenderness to the hamstrings, quadriceps, or adductor muscles.    Left hip: She has full ROM with flexion and extension without pain. She has full ROM with flexion and extension without pain. 5/5 strength with hip abduction, adduction, flexion, and extension. JONH and FADIR are negative. Log roll does not produce hip pain. Charity test negative. No tenderness to the following on the left side: gluteus medius, tensor fascial latae, iliac crest, ASIS, AIIS, greater trochanter, femur, hamstrings, quadriceps, or adductor muscles.    The RIGHT knee has trace joint effusion. Patella crepitus and grind are positive. There is minimal tenderness to the medial and lateral joint lines. Flexion and extension are without mechanical blocking. There is no instability with stress testing.     The LEFT knee has no joint effusion. Patella crepitus and grind are negative.  There is no tenderness to the medial and lateral joint lines. Flexion and extension are without mechanical blocking. There is no instability with stress testing.     Skin - no rashes, sores, or open lesions. Strength, sensory and vascular exams are otherwise normal. There is no clubbing, cyanosis or edema.    Gait is slightly antalgic and tandem.    Imaging:  === 04/24/24 ===  MR LUMBAR SPINE WITHOUT CONTRAST  IMPRESSION:  1. Multilevel spondylosis of the lumbar spine without spinal canal  stenosis. Varying degrees of mild neural foraminal stenosis.  Signed by: Saloni Barragan 5/8/2024 2:19 PM    === 03/29/24 ===  XR PELVIS AND LEFT HIP 2 VIEWS  IMPRESSION:  1. Moderate osteoarthrosis of the bilateral hips.    Signed by: Live Ritter 3/29/2024 3:37 PM    === 03/29/24 ===  XR LUMBAR SPINE COMPLETE 4+ VIEWS  - Impression -  1. Multilevel degenerative change, predominantly facet hypertrophy,  with severe degenerative changes at L4-5 and L5-S1.  Signed by: Live Ritter 3/29/2024 3:37 PM    === 03/29/24 ===  XR RIGHT KNEE 3 VIEWS  IMPRESSION:  Mild right knee osteoarthritis.  Signed by: Live Ritter 3/29/2024 3:37 PM    Problem List Items Addressed This Visit    None  Visit Diagnoses         Codes    Hip abductor tendonitis, right    -  Primary M76.891    Iliotibial band tendonitis, right     M76.31    Primary osteoarthritis of right knee     M17.11          Assessment and Plan:     We reviewed the exam and x-ray findings and discussed the conservative and surgical treatment options. We discussed that she has had improvement of her right hip abductor tendonitis and right IT band tendonitis, as well as with her pain related to right knee osteoarthritis. We agreed to continue with physical therapy, home exercises, and meloxicam at this time for her pain. She should continue to follow with pain management for her spine. Follow-up as needed.    **This note was dictated using Dragon speech recognition software and was not  corrected for spelling or grammatical errors**.    Naun Wesley MD, MEd  Primary Care Sports Medicine Fellow, PGY-4  [facility]

## 2024-09-26 ENCOUNTER — TREATMENT (OUTPATIENT)
Dept: PHYSICAL THERAPY | Facility: CLINIC | Age: 59
End: 2024-09-26
Payer: MEDICARE

## 2024-09-26 ENCOUNTER — HOSPITAL ENCOUNTER (OUTPATIENT)
Dept: NEUROLOGY | Facility: CLINIC | Age: 59
Discharge: HOME | End: 2024-09-26
Payer: MEDICARE

## 2024-09-26 DIAGNOSIS — M25.561 ACUTE PAIN OF RIGHT KNEE: ICD-10-CM

## 2024-09-26 DIAGNOSIS — M54.12 CERVICAL RADICULITIS: ICD-10-CM

## 2024-09-26 PROCEDURE — 95911 NRV CNDJ TEST 9-10 STUDIES: CPT | Performed by: PSYCHIATRY & NEUROLOGY

## 2024-09-26 PROCEDURE — 97110 THERAPEUTIC EXERCISES: CPT | Mod: GP

## 2024-09-26 PROCEDURE — 95886 MUSC TEST DONE W/N TEST COMP: CPT | Performed by: PSYCHIATRY & NEUROLOGY

## 2024-09-26 NOTE — PROGRESS NOTES
"Physical Therapy    Physical Therapy Treatment    Patient Name: Bisi Schwarz  MRN: [id number]  Today's Date: 9/26/2024    Time Entry:   Time Calculation  Start Time: 1020  Stop Time: 1105  Time Calculation (min): 45 min     PT Therapeutic Procedures Time Entry  Therapeutic Exercise Time Entry: 45  Visit: 5    Assessment:  PT Assessment  Assessment Comment: The patient tolerated the therapeutic exerise well this session.  Plan:  OP PT Plan  Treatment/Interventions: Cryotherapy, Dry needling, Education/ Instruction, Electrical stimulation, Hot pack, Mechanical traction, Neuromuscular re-education, Therapeutic activities, Therapeutic exercises  PT Plan: Skilled PT  PT Frequency: 1 time per week  Duration: 8 weeks  Certification Period Start Date: 05/20/24  Certification Period End Date: 08/18/24  Rehab Potential: Excellent  Plan of Care Agreement: Patient    Current Problem  Problem List Items Addressed This Visit    None  Visit Diagnoses         Codes    Acute pain of right knee     M25.561              Subjective:      General  Reason for Referral: right knee and low back pain  Referred By: Doc Vasquez MD  Preferred Learning Style: kinesthetic, verbal, visual, written  General Comment: Patient reports compliance with her home exercise program.      Objective     Treatments:  Therapeutic Exercise  Therapeutic Exercise Performed: Yes  Therapeutic Exercise Activity 1: hamstring stretch 3 x 30\"  Therapeutic Exercise Activity 2: calf stretch 3 x 30\"  Therapeutic Exercise Activity 3: heel slides 2 x 10 x 5\"  Therapeutic Exercise Activity 4: quad sets 2 x 10 x 5\"  Therapeutic Exercise Activity 5: SAQ 2# 3 x 10 x 3\"  Therapeutic Exercise Activity 6: KE 22# 3 x 10  Therapeutic Exercise Activity 7: bike x 10'  Therapeutic Exercise Activity 8: hook lying hip adduction isometrics 3 x 10 x 5\"  Therapeutic Exercise Activity 9: hook lying clamshells green TB 3 x 10  Therapeutic Exercise Activity 10: HSC 22# 3 x " "10  Therapeutic Exercise Activity 11: bridge 2 x 10 x 3\"  Therapeutic Exercise Activity 12: back ext machine 40# x 20  Therapeutic Exercise Activity 13: piriformis stretch 3 x 30\"  Therapeutic Exercise Activity 14: leg press 80# 3 x 10  Therapeutic Exercise Activity 15: knee flexion stretch on step 10 x 5\"      Goals:  Active       PT Problem       Patient will report compliance with her home exercise program.        Start:  11/27/23    Expected End:  02/25/24            Patient will report improvement via the MEENA to show improvements in activity tolerance.        Start:  11/27/23    Expected End:  02/25/24            Patient will demonstrate improvements in core and hip strength to 5/5 to improve lumbopelvic stability with daily activities.        Start:  11/27/23    Expected End:  02/25/24              "

## 2024-10-07 ENCOUNTER — TREATMENT (OUTPATIENT)
Dept: PHYSICAL THERAPY | Facility: CLINIC | Age: 59
End: 2024-10-07
Payer: MEDICARE

## 2024-10-07 DIAGNOSIS — M25.561 ACUTE PAIN OF RIGHT KNEE: ICD-10-CM

## 2024-10-07 PROCEDURE — 97110 THERAPEUTIC EXERCISES: CPT | Mod: GP

## 2024-10-07 NOTE — PROGRESS NOTES
"Physical Therapy    Physical Therapy Treatment    Patient Name: Bisi Schwarz  MRN: [id number]  Today's Date: 10/7/2024    Time Entry:   Time Calculation  Start Time: 1045  Stop Time: 1130  Time Calculation (min): 45 min     PT Therapeutic Procedures Time Entry  Therapeutic Exercise Time Entry: 45  Visit: 6    Assessment:  PT Assessment  Assessment Comment: The patient tolerated the therapeutic exerise well this session.  Plan:  OP PT Plan  Treatment/Interventions: Cryotherapy, Dry needling, Education/ Instruction, Electrical stimulation, Hot pack, Mechanical traction, Neuromuscular re-education, Therapeutic activities, Therapeutic exercises  PT Plan: Skilled PT  PT Frequency: 1 time per week  Duration: 8 weeks  Certification Period Start Date: 05/20/24  Certification Period End Date: 08/18/24  Rehab Potential: Excellent  Plan of Care Agreement: Patient    Current Problem  Problem List Items Addressed This Visit    None  Visit Diagnoses         Codes    Acute pain of right knee     M25.561              Subjective:     General  Reason for Referral: right knee and low back pain  Referred By: Doc Vasquez MD  Preferred Learning Style: kinesthetic, verbal, visual, written  General Comment: Patient reports compliance with her home exercise program. States her knee is feeling a little better.       Objective     Treatments:  Therapeutic Exercise  Therapeutic Exercise Performed: Yes  Therapeutic Exercise Activity 1: hamstring stretch 3 x 30\"  Therapeutic Exercise Activity 2: calf stretch 3 x 30\"  Therapeutic Exercise Activity 3: heel slides 2 x 10 x 5\"  Therapeutic Exercise Activity 4: quad sets 2 x 10 x 5\"  Therapeutic Exercise Activity 5: SAQ 2# 3 x 10 x 3\"  Therapeutic Exercise Activity 6: KE 22# 3 x 10  Therapeutic Exercise Activity 7: bike x 10'  Therapeutic Exercise Activity 8: hook lying hip adduction isometrics 3 x 10 x 5\"  Therapeutic Exercise Activity 9: hook lying clamshells green TB 3 x 10  Therapeutic " "Exercise Activity 10: HSC 22# 3 x 10  Therapeutic Exercise Activity 11: bridge 2 x 10 x 3\"  Therapeutic Exercise Activity 12: back ext machine 40# x 20  Therapeutic Exercise Activity 13: piriformis stretch 3 x 30\"  Therapeutic Exercise Activity 14: leg press 80# 3 x 10  Therapeutic Exercise Activity 15: knee flexion stretch on step 10 x 5\"    Goals:  Active       PT Problem       Patient will report compliance with her home exercise program.        Start:  11/27/23    Expected End:  02/25/24            Patient will report improvement via the MEENA to show improvements in activity tolerance.        Start:  11/27/23    Expected End:  02/25/24            Patient will demonstrate improvements in core and hip strength to 5/5 to improve lumbopelvic stability with daily activities.        Start:  11/27/23    Expected End:  02/25/24              "

## 2024-10-11 DIAGNOSIS — K59.00 CONSTIPATION, UNSPECIFIED CONSTIPATION TYPE: ICD-10-CM

## 2024-10-11 RX ORDER — DOCUSATE SODIUM 100 MG/1
100 CAPSULE, LIQUID FILLED ORAL 2 TIMES DAILY
Qty: 60 CAPSULE | Refills: 0 | Status: SHIPPED | OUTPATIENT
Start: 2024-10-11

## 2024-10-21 ENCOUNTER — TREATMENT (OUTPATIENT)
Dept: PHYSICAL THERAPY | Facility: CLINIC | Age: 59
End: 2024-10-21
Payer: MEDICARE

## 2024-10-21 DIAGNOSIS — M25.561 ACUTE PAIN OF RIGHT KNEE: ICD-10-CM

## 2024-10-21 PROCEDURE — 97110 THERAPEUTIC EXERCISES: CPT | Mod: GP

## 2024-10-21 NOTE — PROGRESS NOTES
"Physical Therapy    Physical Therapy Treatment    Patient Name: Bisi Schwarz  MRN: [id number]  Today's Date: 10/21/2024    Time Entry:   Time Calculation  Start Time: 1050  Stop Time: 1135  Time Calculation (min): 45 min     PT Therapeutic Procedures Time Entry  Therapeutic Exercise Time Entry: 45  Visit: 7  Visit Limit: 3 more through 11/19/24    Assessment:  PT Assessment  Assessment Comment: The patient tolerated the therapeutic exerise well this session.  Plan:  OP PT Plan  Treatment/Interventions: Cryotherapy, Dry needling, Education/ Instruction, Electrical stimulation, Hot pack, Mechanical traction, Neuromuscular re-education, Therapeutic activities, Therapeutic exercises  PT Plan: Skilled PT  PT Frequency: 1 time per week  Duration: 8 weeks  Certification Period Start Date: 05/20/24  Certification Period End Date: 08/18/24  Rehab Potential: Excellent  Plan of Care Agreement: Patient    Current Problem  Problem List Items Addressed This Visit    None  Visit Diagnoses         Codes    Acute pain of right knee     M25.561              Subjective:   General  Reason for Referral: right knee and low back pain  Referred By: Doc Vasquez MD  Preferred Learning Style: kinesthetic, verbal, visual, written  General Comment: Patient reports compliance with her home exercise program. States she has been walking a lot and her knee and low back are sore.     Objective     Treatments:  Therapeutic Exercise  Therapeutic Exercise Performed: Yes  Therapeutic Exercise Activity 1: hamstring stretch 3 x 30\"  Therapeutic Exercise Activity 2: calf stretch 3 x 30\"  Therapeutic Exercise Activity 3: heel slides 2 x 10 x 5\"  Therapeutic Exercise Activity 4: quad sets 2 x 10 x 5\"  Therapeutic Exercise Activity 5: SAQ 2# 3 x 10 x 3\"  Therapeutic Exercise Activity 6: KE 22# 3 x 10  Therapeutic Exercise Activity 7: bike x 10'  Therapeutic Exercise Activity 8: hook lying hip adduction isometrics 3 x 10 x 5\"  Therapeutic Exercise " "Activity 9: hook lying clamshells green TB 3 x 10  Therapeutic Exercise Activity 10: HSC 22# 3 x 10  Therapeutic Exercise Activity 11: bridge 2 x 10 x 3\"  Therapeutic Exercise Activity 12: back ext machine 40# x 20  Therapeutic Exercise Activity 13: piriformis stretch 3 x 30\"  Therapeutic Exercise Activity 14: leg press 80# 3 x 10  Therapeutic Exercise Activity 15: knee flexion stretch on step 10 x 5\"    Goals:  Active       PT Problem       Patient will report compliance with her home exercise program.        Start:  11/27/23    Expected End:  02/25/24            Patient will report improvement via the MEENA to show improvements in activity tolerance.        Start:  11/27/23    Expected End:  02/25/24            Patient will demonstrate improvements in core and hip strength to 5/5 to improve lumbopelvic stability with daily activities.        Start:  11/27/23    Expected End:  02/25/24              "

## 2024-10-25 DIAGNOSIS — E11.9 TYPE 2 DIABETES MELLITUS WITHOUT COMPLICATION, WITHOUT LONG-TERM CURRENT USE OF INSULIN (MULTI): ICD-10-CM

## 2024-10-28 ENCOUNTER — DOCUMENTATION (OUTPATIENT)
Dept: PRIMARY CARE | Facility: CLINIC | Age: 59
End: 2024-10-28
Payer: MEDICARE

## 2024-10-28 RX ORDER — DULAGLUTIDE 0.75 MG/.5ML
INJECTION, SOLUTION SUBCUTANEOUS
Qty: 6 ML | Refills: 2 | Status: SHIPPED | OUTPATIENT
Start: 2024-10-28

## 2024-11-07 ENCOUNTER — TREATMENT (OUTPATIENT)
Dept: PHYSICAL THERAPY | Facility: CLINIC | Age: 59
End: 2024-11-07
Payer: MEDICARE

## 2024-11-07 ENCOUNTER — APPOINTMENT (OUTPATIENT)
Dept: PHYSICAL THERAPY | Facility: CLINIC | Age: 59
End: 2024-11-07
Payer: MEDICARE

## 2024-11-07 DIAGNOSIS — M25.561 ACUTE PAIN OF RIGHT KNEE: ICD-10-CM

## 2024-11-07 PROCEDURE — 97110 THERAPEUTIC EXERCISES: CPT | Mod: GP

## 2024-11-07 NOTE — PROGRESS NOTES
"Physical Therapy    Physical Therapy Treatment    Patient Name: Bisi Schwarz  MRN: [id number]  Today's Date: 11/7/2024    Time Entry:   Time Calculation  Start Time: 0830  Stop Time: 0915  Time Calculation (min): 45 min     PT Therapeutic Procedures Time Entry  Therapeutic Exercise Time Entry: 45  Visit: 8  Visit Limit: 2 more through 11/19/24    Assessment:  PT Assessment  Assessment Comment: The patient tolerated the therapeutic exerise well this session.  Plan:  OP PT Plan  Treatment/Interventions: Cryotherapy, Dry needling, Education/ Instruction, Electrical stimulation, Hot pack, Mechanical traction, Neuromuscular re-education, Therapeutic activities, Therapeutic exercises  PT Plan: Skilled PT  PT Frequency: 1 time per week  Duration: 8 weeks  Certification Period Start Date: 05/20/24  Certification Period End Date: 08/18/24  Rehab Potential: Excellent  Plan of Care Agreement: Patient    Current Problem  Problem List Items Addressed This Visit    None  Visit Diagnoses         Codes    Acute pain of right knee     M25.561              Subjective:   General  Reason for Referral: right knee and low back pain  Referred By: Doc Vasquez MD  Preferred Learning Style: kinesthetic, verbal, visual, written  General Comment: Patient reports compliance with her home exercise program.    Objective     Treatments:  Therapeutic Exercise  Therapeutic Exercise Performed: Yes  Therapeutic Exercise Activity 1: hamstring stretch 3 x 30\"  Therapeutic Exercise Activity 2: calf stretch 3 x 30\"  Therapeutic Exercise Activity 3: heel slides 2 x 10 x 5\"  Therapeutic Exercise Activity 4: quad sets 2 x 10 x 5\"  Therapeutic Exercise Activity 5: SAQ 2# 3 x 10 x 3\"  Therapeutic Exercise Activity 6: KE 22# 3 x 10  Therapeutic Exercise Activity 7: bike x 5'  Therapeutic Exercise Activity 8: hook lying hip adduction isometrics 3 x 10 x 5\"  Therapeutic Exercise Activity 9: hook lying clamshells green TB 3 x 10  Therapeutic Exercise " "Activity 10: HSC 22# 3 x 10  Therapeutic Exercise Activity 11: bridge 2 x 10 x 3\"  Therapeutic Exercise Activity 12: back ext machine 40# x 20  Therapeutic Exercise Activity 13: piriformis stretch 3 x 30\"  Therapeutic Exercise Activity 14: leg press 80# 3 x 10  Therapeutic Exercise Activity 15: knee flexion stretch on step 10 x 5\"  Therapeutic Exercise Activity 16: SLR 3 x 10    Goals:  Active       PT Problem       Patient will report compliance with her home exercise program.        Start:  11/27/23    Expected End:  02/25/24            Patient will report improvement via the MEENA to show improvements in activity tolerance.        Start:  11/27/23    Expected End:  02/25/24            Patient will demonstrate improvements in core and hip strength to 5/5 to improve lumbopelvic stability with daily activities.        Start:  11/27/23    Expected End:  02/25/24              "

## 2024-11-13 ENCOUNTER — TREATMENT (OUTPATIENT)
Dept: PHYSICAL THERAPY | Facility: CLINIC | Age: 59
End: 2024-11-13
Payer: MEDICARE

## 2024-11-13 DIAGNOSIS — M76.31 ILIOTIBIAL BAND TENDONITIS, RIGHT: ICD-10-CM

## 2024-11-13 DIAGNOSIS — M54.40 LOW BACK PAIN WITH SCIATICA, SCIATICA LATERALITY UNSPECIFIED, UNSPECIFIED BACK PAIN LATERALITY, UNSPECIFIED CHRONICITY: ICD-10-CM

## 2024-11-13 DIAGNOSIS — M25.561 ACUTE PAIN OF RIGHT KNEE: Primary | ICD-10-CM

## 2024-11-13 DIAGNOSIS — M76.891 HIP ABDUCTOR TENDONITIS, RIGHT: ICD-10-CM

## 2024-11-13 PROCEDURE — 97110 THERAPEUTIC EXERCISES: CPT | Mod: GP

## 2024-11-13 NOTE — PROGRESS NOTES
"Physical Therapy    Physical Therapy Treatment    Patient Name: Bisi Schwarz  MRN: [id number]  Today's Date: 11/13/2024    Time Entry:   Time Calculation  Start Time: 0845  Stop Time: 0930  Time Calculation (min): 45 min     PT Therapeutic Procedures Time Entry  Therapeutic Exercise Time Entry: 45  Visit: 9  Visit Limit: 1 more through 11/19/24     Assessment:  PT Assessment  Assessment Comment: The patient tolerated the therapeutic exerise well this session.  Plan:  OP PT Plan  Treatment/Interventions: Cryotherapy, Dry needling, Education/ Instruction, Electrical stimulation, Hot pack, Mechanical traction, Neuromuscular re-education, Therapeutic activities, Therapeutic exercises  PT Plan: Skilled PT  PT Frequency: 1 time per week  Duration: 8 weeks  Certification Period Start Date: 05/20/24  Certification Period End Date: 08/18/24  Rehab Potential: Excellent  Plan of Care Agreement: Patient    Current Problem  Problem List Items Addressed This Visit             ICD-10-CM    Low back pain M54.50     Other Visit Diagnoses         Codes    Acute pain of right knee    -  Primary M25.561    Hip abductor tendonitis, right     M76.891    Iliotibial band tendonitis, right     M76.31              Subjective:     General  Reason for Referral: right knee and low back pain  Referred By: Doc Vasquez MD  Preferred Learning Style: kinesthetic, verbal, visual, written  General Comment: Patient reports compliance with her home exercise program.    Objective     Treatments:  Therapeutic Exercise  Therapeutic Exercise Performed: Yes  Therapeutic Exercise Activity 1: hamstring stretch 3 x 30\"  Therapeutic Exercise Activity 2: calf stretch 3 x 30\"  Therapeutic Exercise Activity 3: heel slides 2 x 10 x 5\"  Therapeutic Exercise Activity 4: quad sets 2 x 10 x 5\"  Therapeutic Exercise Activity 5: SAQ 2# 3 x 10 x 3\"  Therapeutic Exercise Activity 6: KE 22# 3 x 10  Therapeutic Exercise Activity 7: bike x 5'  Therapeutic Exercise " "Activity 8: hook lying hip adduction isometrics 3 x 10 x 5\"  Therapeutic Exercise Activity 9: hook lying clamshells green TB 3 x 10  Therapeutic Exercise Activity 10: HSC 22# 3 x 10  Therapeutic Exercise Activity 11: bridge 2 x 10 x 3\"  Therapeutic Exercise Activity 12: back ext machine 40# x 30  Therapeutic Exercise Activity 13: piriformis stretch 3 x 30\"  Therapeutic Exercise Activity 14: leg press 80# 3 x 10  Therapeutic Exercise Activity 15: knee flexion stretch on step 10 x 5\"  Therapeutic Exercise Activity 16: SLR 3 x 10    Goals:  Active       PT Problem       Patient will report compliance with her home exercise program.        Start:  11/27/23    Expected End:  02/25/24            Patient will report improvement via the MEENA to show improvements in activity tolerance.        Start:  11/27/23    Expected End:  02/25/24            Patient will demonstrate improvements in core and hip strength to 5/5 to improve lumbopelvic stability with daily activities.        Start:  11/27/23    Expected End:  02/25/24              "

## 2024-11-20 ENCOUNTER — TREATMENT (OUTPATIENT)
Dept: PHYSICAL THERAPY | Facility: CLINIC | Age: 59
End: 2024-11-20
Payer: MEDICARE

## 2024-11-20 DIAGNOSIS — M25.561 ACUTE PAIN OF RIGHT KNEE: ICD-10-CM

## 2024-11-20 PROCEDURE — 97110 THERAPEUTIC EXERCISES: CPT | Mod: GP

## 2024-11-20 NOTE — PROGRESS NOTES
"Physical Therapy    Physical Therapy Treatment    Patient Name: Bisi Schwarz  MRN: [id number]  Today's Date: 11/20/2024    Time Entry:   Time Calculation  Start Time: 1000  Stop Time: 1045  Time Calculation (min): 45 min     PT Therapeutic Procedures Time Entry  Therapeutic Exercise Time Entry: 45  Visit: 10    Assessment:  PT Assessment  Assessment Comment: The patient tolerated the therapeutic exerise well this session.  Plan:  Discharge to a home exercise program.     Current Problem  Problem List Items Addressed This Visit    None  Visit Diagnoses         Codes    Acute pain of right knee     M25.561              Subjective:   General  Reason for Referral: right knee and low back pain  Referred By: Doc Vasquez MD  Preferred Learning Style: kinesthetic, verbal, visual, written  General Comment: Patient reports compliance with her home exercise program.    Objective     Treatments:  Therapeutic Exercise  Therapeutic Exercise Performed: Yes  Therapeutic Exercise Activity 1: hamstring stretch 3 x 30\"  Therapeutic Exercise Activity 2: calf stretch 3 x 30\"  Therapeutic Exercise Activity 3: heel slides 2 x 10 x 5\"  Therapeutic Exercise Activity 4: quad sets 2 x 10 x 5\"  Therapeutic Exercise Activity 5: SAQ 2# 3 x 10 x 3\"  Therapeutic Exercise Activity 6: KE 22# 3 x 10  Therapeutic Exercise Activity 7: bike x 5'  Therapeutic Exercise Activity 8: hook lying hip adduction isometrics 3 x 10 x 5\"  Therapeutic Exercise Activity 9: hook lying clamshells green TB 3 x 10  Therapeutic Exercise Activity 10: HSC 22# 3 x 10  Therapeutic Exercise Activity 11: bridge 2 x 10 x 3\"  Therapeutic Exercise Activity 12: back ext machine 40# x 30  Therapeutic Exercise Activity 13: piriformis stretch 3 x 30\"  Therapeutic Exercise Activity 14: leg press 80# 3 x 10  Therapeutic Exercise Activity 15: knee flexion stretch on step 10 x 5\"  Therapeutic Exercise Activity 16: SLR 3 x 10    Goals:  Active       PT Problem       Patient will " report compliance with her home exercise program.        Start:  11/27/23    Expected End:  02/25/24            Patient will report improvement via the MEENA to show improvements in activity tolerance.        Start:  11/27/23    Expected End:  02/25/24            Patient will demonstrate improvements in core and hip strength to 5/5 to improve lumbopelvic stability with daily activities.        Start:  11/27/23    Expected End:  02/25/24

## 2024-12-06 DIAGNOSIS — M76.891 HIP ABDUCTOR TENDONITIS, RIGHT: ICD-10-CM

## 2024-12-07 RX ORDER — MELOXICAM 15 MG/1
15 TABLET ORAL DAILY
Qty: 30 TABLET | Refills: 1 | Status: SHIPPED | OUTPATIENT
Start: 2024-12-07 | End: 2025-02-05

## 2024-12-11 ENCOUNTER — DOCUMENTATION (OUTPATIENT)
Dept: PRIMARY CARE | Facility: CLINIC | Age: 59
End: 2024-12-11
Payer: MEDICARE

## 2024-12-11 NOTE — PROGRESS NOTES
"Answering service paged on-call physician and I called her at 7:50 AM.  Ms. Schwarz is a 58 year old woman with a history of hypertension, \"early diabetes\", vestibular migraine with aura, schizophrenia, and \"R leg doesn't move too well since a fall 2 yrs ago\": uses a cane.   She has had positional vertigo for 4-5 days and nausea for 1 day, without vomiting.  She says that when she lies down, or bends forward, the room starts spinning and she tends to fall to the left or backwards.  She has not actually fallen and has not lost consciousness.  She does not have trouble hearing, though 1 week ago she had some ear pain, and now hears a \"wind\" in her ears.  She does not have facial or other numbness, new weakness, tremor, diplopia.  Does have intermittent pains in the back of her head.  Her vital signs today are:  /78, P 65, T 97.8, Blood glucose 106 this am.    Until 4-5 days ago, she had not recently had any vertigo or balance problems.      She was diagnosed in June 2024 by neurologist Dr. Aasef Shaikh with vestibular migraines and started on amitriptyline, which she did not tolerate because of elevated BP.  She declined topiramate as an alternative.  She says she has been doing well and therefore \"was dismissed\" by the neurologist.   His note states that she had a workup for similar symptoms in 2018, including MRI with \"rounded density over L frontal lobe\", stable in size and consistent with a meningioma, and small white matter changes.   Her PCP is Dr. Baker and she would like to be seen if possible.      No openings in the office schedule today.  I asked PSR to schedule her for tomorrow.  I recommended that Ms. Schwarz contact her neurologist at [facility] who was treating her for vestibular migraines, which she agreed to do.  She lives alone but has help from her mother, e.g. transportation.   It is important to remain hydrated and she should seek emergency help if not able to drink liquids, " or if vomiting.

## 2024-12-18 ENCOUNTER — APPOINTMENT (OUTPATIENT)
Dept: NEUROLOGY | Facility: CLINIC | Age: 59
End: 2024-12-18
Payer: MEDICARE

## 2024-12-19 ENCOUNTER — APPOINTMENT (OUTPATIENT)
Dept: PRIMARY CARE | Facility: CLINIC | Age: 59
End: 2024-12-19
Payer: MEDICARE

## 2024-12-19 VITALS
OXYGEN SATURATION: 96 % | DIASTOLIC BLOOD PRESSURE: 76 MMHG | TEMPERATURE: 96.8 F | HEART RATE: 67 BPM | SYSTOLIC BLOOD PRESSURE: 117 MMHG | HEIGHT: 69 IN | WEIGHT: 278 LBS | BODY MASS INDEX: 41.18 KG/M2

## 2024-12-19 DIAGNOSIS — R42 VERTIGO: Primary | ICD-10-CM

## 2024-12-19 RX ORDER — MECLIZINE HCL 12.5 MG 12.5 MG/1
12.5 TABLET ORAL 3 TIMES DAILY PRN
Qty: 30 TABLET | Refills: 11 | Status: SHIPPED | OUTPATIENT
Start: 2024-12-19 | End: 2025-12-19

## 2024-12-19 ASSESSMENT — PATIENT HEALTH QUESTIONNAIRE - PHQ9
2. FEELING DOWN, DEPRESSED OR HOPELESS: NOT AT ALL
SUM OF ALL RESPONSES TO PHQ9 QUESTIONS 1 AND 2: 0
1. LITTLE INTEREST OR PLEASURE IN DOING THINGS: NOT AT ALL

## 2024-12-19 ASSESSMENT — PAIN SCALES - GENERAL: PAINLEVEL_OUTOF10: 0-NO PAIN

## 2024-12-19 ASSESSMENT — ENCOUNTER SYMPTOMS: DEPRESSION: 0

## 2024-12-19 NOTE — PROGRESS NOTES
"Subjective   Patient ID: Bisi Schwarz is a 58 y.o. female who presents for Vertigo and Fall.    Ms Schwarz is presenting due to increased dizziness over the past week. Symptoms have been stable since Thursday. She was previously diagnosed with vertigo and vestibular migraines, for which she follows with neurology. She was started on amitriptyline, however, due to blood pressure increase it was discontinued. She was switched to Topomax but the patient self discontinued this medication.            Review of Systems    Objective   /76 (BP Location: Right arm, Patient Position: Sitting)   Pulse 67   Temp 36 °C (96.8 °F) (Temporal)   Ht 1.753 m (5' 9\")   Wt 126 kg (278 lb)   LMP  (LMP Unknown)   SpO2 96%   BMI 41.05 kg/m²     Physical Exam  Constitutional:       Appearance: Normal appearance.   HENT:      Mouth/Throat:      Mouth: Mucous membranes are moist.      Pharynx: Oropharynx is clear.   Cardiovascular:      Rate and Rhythm: Normal rate.      Pulses: Normal pulses.   Pulmonary:      Effort: Pulmonary effort is normal.      Breath sounds: Normal breath sounds. No wheezing or rhonchi.   Musculoskeletal:      Cervical back: Normal range of motion.   Neurological:      Mental Status: She is alert.   Psychiatric:         Mood and Affect: Mood normal.         Behavior: Behavior normal.         Assessment/Plan   Problem List Items Addressed This Visit       Vertigo - Primary    Relevant Medications    meclizine (Antivert) 12.5 mg tablet     Ms Schwarz is presenting due to increased dizziness over the past week. Symptoms have been stable since Thursday, no red flag symptoms or falls.. She was previously diagnosed with vertigo and vestibular migraines, for which she follows with neurology. She was previously on amitriptyline and then Topamax, but is not currently on any medication. Will Rx Antivert at this time, with resources on vestibular exercises. Patient encouraged to schedule follow up with " neurology.     #Vertigo  - vertigo, dizziness, had some falls but not since Thursday/Friday  -- states she has tried amytriptiline which raised her BP, tried topamax which she stated made her feel strange so she stopped  - No red flags symptoms  - Ordered meclizine and vestibular exercises  - patient to follow up with Neurology  - RTC for any acute concerns with PCP    Patient discussed with attending physician Dr. Baker  Plan preliminary until cosigned by attending physician.    Freddy Castro MD  Family Medicine  PGY-2

## 2024-12-20 DIAGNOSIS — E11.9 TYPE 2 DIABETES MELLITUS WITHOUT COMPLICATION, UNSPECIFIED WHETHER LONG TERM INSULIN USE (MULTI): Primary | ICD-10-CM

## 2024-12-20 NOTE — TELEPHONE ENCOUNTER
Pt called requesting refill of OneTouch test strips. Pt confirms she forgot during appt yesterday. Order pended and routed to provider.

## 2024-12-23 RX ORDER — BLOOD SUGAR DIAGNOSTIC
1 STRIP MISCELLANEOUS 3 TIMES DAILY
Qty: 100 EACH | Refills: 2 | Status: SHIPPED | OUTPATIENT
Start: 2024-12-23

## 2024-12-23 NOTE — PROGRESS NOTES
I saw and evaluated the patient. I personally obtained the key and critical portions of the history and physical exam or was physically present for key and critical portions performed by the resident/fellow. I reviewed the resident/fellow's documentation and discussed the patient with the resident/fellow. I agree with the resident/fellow's medical decision making as documented in the note.    Naun Baker MD

## 2024-12-26 ENCOUNTER — TELEPHONE (OUTPATIENT)
Facility: HOSPITAL | Age: 59
End: 2024-12-26
Payer: MEDICARE

## 2024-12-26 DIAGNOSIS — K59.00 CONSTIPATION, UNSPECIFIED CONSTIPATION TYPE: ICD-10-CM

## 2024-12-26 NOTE — TELEPHONE ENCOUNTER
PATIENT CALLED AND REQUESTED FLONASE TO BE PRESCRIBED BUT DOESN'T HAVE THE MEDICATION IN HER LIST OF MEDICATION. MESSAGE ROUTED TO THE PROVIDER.

## 2024-12-27 DIAGNOSIS — H69.93 DYSFUNCTION OF BOTH EUSTACHIAN TUBES: Primary | ICD-10-CM

## 2024-12-27 RX ORDER — FLUTICASONE PROPIONATE 50 MCG
1 SPRAY, SUSPENSION (ML) NASAL DAILY
Qty: 16 G | Refills: 5 | Status: SHIPPED | OUTPATIENT
Start: 2024-12-27 | End: 2025-12-27

## 2024-12-30 RX ORDER — DOCUSATE SODIUM 100 MG/1
100 CAPSULE, LIQUID FILLED ORAL 2 TIMES DAILY
Qty: 60 CAPSULE | Refills: 11 | Status: SHIPPED | OUTPATIENT
Start: 2024-12-30

## 2025-02-05 DIAGNOSIS — E03.8 OTHER SPECIFIED HYPOTHYROIDISM: ICD-10-CM

## 2025-02-06 RX ORDER — LEVOTHYROXINE SODIUM 112 UG/1
112 TABLET ORAL DAILY
Qty: 30 TABLET | Refills: 11 | Status: SHIPPED | OUTPATIENT
Start: 2025-02-06 | End: 2026-02-06

## 2025-02-19 DIAGNOSIS — R73.09 OTHER ABNORMAL GLUCOSE: ICD-10-CM

## 2025-02-19 RX ORDER — ATORVASTATIN CALCIUM 20 MG/1
20 TABLET, FILM COATED ORAL DAILY
Qty: 90 TABLET | Refills: 6 | Status: SHIPPED | OUTPATIENT
Start: 2025-02-19

## 2025-03-12 ENCOUNTER — APPOINTMENT (OUTPATIENT)
Dept: PRIMARY CARE | Facility: CLINIC | Age: 60
End: 2025-03-12
Payer: MEDICARE

## 2025-03-12 VITALS
OXYGEN SATURATION: 98 % | HEART RATE: 73 BPM | DIASTOLIC BLOOD PRESSURE: 78 MMHG | HEIGHT: 69 IN | TEMPERATURE: 94.2 F | WEIGHT: 281.2 LBS | BODY MASS INDEX: 41.65 KG/M2 | SYSTOLIC BLOOD PRESSURE: 121 MMHG

## 2025-03-12 DIAGNOSIS — J45.20 MILD INTERMITTENT ASTHMA WITHOUT COMPLICATION (HHS-HCC): ICD-10-CM

## 2025-03-12 DIAGNOSIS — E11.9 TYPE 2 DIABETES MELLITUS WITHOUT COMPLICATION, WITHOUT LONG-TERM CURRENT USE OF INSULIN (MULTI): ICD-10-CM

## 2025-03-12 LAB — POC HEMOGLOBIN A1C: 5.8 % (ref 4.2–6.5)

## 2025-03-12 PROCEDURE — 99214 OFFICE O/P EST MOD 30 MIN: CPT | Performed by: STUDENT IN AN ORGANIZED HEALTH CARE EDUCATION/TRAINING PROGRAM

## 2025-03-12 PROCEDURE — 3008F BODY MASS INDEX DOCD: CPT | Performed by: STUDENT IN AN ORGANIZED HEALTH CARE EDUCATION/TRAINING PROGRAM

## 2025-03-12 PROCEDURE — 83036 HEMOGLOBIN GLYCOSYLATED A1C: CPT | Mod: MUE | Performed by: STUDENT IN AN ORGANIZED HEALTH CARE EDUCATION/TRAINING PROGRAM

## 2025-03-12 PROCEDURE — 1036F TOBACCO NON-USER: CPT | Performed by: STUDENT IN AN ORGANIZED HEALTH CARE EDUCATION/TRAINING PROGRAM

## 2025-03-12 PROCEDURE — 3074F SYST BP LT 130 MM HG: CPT | Performed by: STUDENT IN AN ORGANIZED HEALTH CARE EDUCATION/TRAINING PROGRAM

## 2025-03-12 PROCEDURE — 3078F DIAST BP <80 MM HG: CPT | Performed by: STUDENT IN AN ORGANIZED HEALTH CARE EDUCATION/TRAINING PROGRAM

## 2025-03-12 RX ORDER — DULAGLUTIDE 0.75 MG/.5ML
INJECTION, SOLUTION SUBCUTANEOUS
Qty: 6 ML | Refills: 2 | Status: SHIPPED | OUTPATIENT
Start: 2025-03-12

## 2025-03-12 RX ORDER — ALBUTEROL SULFATE 90 UG/1
2 INHALANT RESPIRATORY (INHALATION) EVERY 4 HOURS PRN
Qty: 6.7 G | Refills: 5 | Status: SHIPPED | OUTPATIENT
Start: 2025-03-12 | End: 2026-03-12

## 2025-03-13 ENCOUNTER — TELEPHONE (OUTPATIENT)
Facility: HOSPITAL | Age: 60
End: 2025-03-13
Payer: MEDICARE

## 2025-03-13 NOTE — TELEPHONE ENCOUNTER
Copied from CRM #[id number]. Topic: Information Request - Trying to reach PCP  >> Mar 13, 2025 10:07 AM Georges KIMBLE wrote:  Patient would like a call back as soon as possible.

## 2025-03-18 NOTE — TELEPHONE ENCOUNTER
Pt requesting callback via PCP and nurse noted request incorrectly routed to her. Urgent route to PCP r/t pt F/U call request submitted Mar 13th 2025 and incorrectly routed to nurse, with nurse acknowledgment of request today, Mar 18th 2025.

## 2025-03-19 DIAGNOSIS — K21.9 GASTROESOPHAGEAL REFLUX DISEASE, UNSPECIFIED WHETHER ESOPHAGITIS PRESENT: ICD-10-CM

## 2025-03-19 DIAGNOSIS — D12.2 BENIGN NEOPLASM OF ASCENDING COLON: Primary | ICD-10-CM

## 2025-03-19 RX ORDER — OMEPRAZOLE 20 MG/1
20 TABLET, DELAYED RELEASE ORAL DAILY
Refills: 2 | COMMUNITY
Start: 2025-03-19 | End: 2026-03-19

## 2025-03-22 DIAGNOSIS — M76.891 HIP ABDUCTOR TENDONITIS, RIGHT: ICD-10-CM

## 2025-03-23 RX ORDER — MELOXICAM 15 MG/1
15 TABLET ORAL DAILY
Qty: 30 TABLET | Refills: 1 | Status: SHIPPED | OUTPATIENT
Start: 2025-03-23 | End: 2025-05-22

## 2025-04-10 ENCOUNTER — DOCUMENTATION (OUTPATIENT)
Facility: HOSPITAL | Age: 60
End: 2025-04-10
Payer: MEDICARE

## 2025-04-10 NOTE — PROGRESS NOTES
Pt contacted this nurse to inform of lab coming to home tomorrow between [phone] to obtain labs. The callback number for results is: [phone]. Provider made aware via task message.

## 2025-04-28 ENCOUNTER — APPOINTMENT (OUTPATIENT)
Dept: OPHTHALMOLOGY | Facility: CLINIC | Age: 60
End: 2025-04-28
Payer: MEDICARE

## 2025-04-28 DIAGNOSIS — H52.222 REGULAR ASTIGMATISM OF LEFT EYE: ICD-10-CM

## 2025-04-28 DIAGNOSIS — E11.9 TYPE 2 DIABETES MELLITUS WITHOUT COMPLICATION, WITHOUT LONG-TERM CURRENT USE OF INSULIN: ICD-10-CM

## 2025-04-28 DIAGNOSIS — H52.03 HYPERMETROPIA OF BOTH EYES: Primary | ICD-10-CM

## 2025-04-28 DIAGNOSIS — H52.4 PRESBYOPIA: ICD-10-CM

## 2025-04-28 PROBLEM — H02.886 MEIBOMIAN GLAND DYSFUNCTION (MGD) OF LEFT EYE: Status: ACTIVE | Noted: 2022-08-05

## 2025-04-28 ASSESSMENT — REFRACTION_WEARINGRX
OS_SPHERE: +0.75
OS_CYLINDER: +0.75
OS_ADD: +2.50
OD_CYLINDER: +0.25
OD_SPHERE: +0.25
OD_ADD: +2.50
OS_AXIS: 010
OD_AXIS: 175

## 2025-04-28 ASSESSMENT — TONOMETRY
OD_IOP_MMHG: 14
OS_IOP_MMHG: 14
IOP_METHOD: GOLDMANN APPLANATION

## 2025-04-28 ASSESSMENT — ENCOUNTER SYMPTOMS
GASTROINTESTINAL NEGATIVE: 0
RESPIRATORY NEGATIVE: 0
CONSTITUTIONAL NEGATIVE: 0
CARDIOVASCULAR NEGATIVE: 0
ALLERGIC/IMMUNOLOGIC NEGATIVE: 0
EYES NEGATIVE: 0
PSYCHIATRIC NEGATIVE: 0
ENDOCRINE NEGATIVE: 0
MUSCULOSKELETAL NEGATIVE: 0
NEUROLOGICAL NEGATIVE: 0
HEMATOLOGIC/LYMPHATIC NEGATIVE: 0

## 2025-04-28 ASSESSMENT — REFRACTION
OS_CYLINDER: +0.50
OS_SPHERE: +1.50
OD_CYLINDER: SPHERE
OS_ADD: +2.50
OD_SPHERE: +1.00
OD_ADD: +2.50
OS_AXIS: 180

## 2025-04-28 ASSESSMENT — SLIT LAMP EXAM - LIDS
COMMENTS: NORMAL
COMMENTS: NORMAL

## 2025-04-28 ASSESSMENT — VISUAL ACUITY
OD_CC: 20/20
METHOD: SNELLEN - LINEAR
CORRECTION_TYPE: GLASSES
OS_CC: 20/20

## 2025-04-28 ASSESSMENT — CONF VISUAL FIELD
OD_SUPERIOR_TEMPORAL_RESTRICTION: 0
OD_INFERIOR_TEMPORAL_RESTRICTION: 0
OD_SUPERIOR_NASAL_RESTRICTION: 0
OS_SUPERIOR_TEMPORAL_RESTRICTION: 0
OS_INFERIOR_TEMPORAL_RESTRICTION: 0
METHOD: COUNTING FINGERS
OS_NORMAL: 1
OS_SUPERIOR_NASAL_RESTRICTION: 0
OD_INFERIOR_NASAL_RESTRICTION: 0
OD_NORMAL: 1
OS_INFERIOR_NASAL_RESTRICTION: 0

## 2025-04-28 ASSESSMENT — REFRACTION_MANIFEST
OS_ADD: +2.50
OS_SPHERE: +0.75
OD_CYLINDER: SPHERE
OS_CYLINDER: +0.50
OD_ADD: +2.50
OS_AXIS: 090
OD_SPHERE: +0.75

## 2025-04-28 ASSESSMENT — CUP TO DISC RATIO
OD_RATIO: .5
OS_RATIO: .45

## 2025-04-28 ASSESSMENT — EXTERNAL EXAM - LEFT EYE: OS_EXAM: NORMAL

## 2025-04-28 ASSESSMENT — EXTERNAL EXAM - RIGHT EYE: OD_EXAM: NORMAL

## 2025-04-28 NOTE — PROGRESS NOTES
Assessment/Plan   Diagnoses and all orders for this visit:  Hypermetropia of both eyes  Regular astigmatism of left eye  Presbyopia  New spec rx released today per patient request. Ocular health wnl for age OU. Monitor 1 year or sooner prn. Refraction billed today. Pt consents to receiving glasses Rx today. Patient's/guardian's signature obtained to acknowledge and confirm that a paper copy of glasses Rx was given to patient in compliance with [facility] Eyeglass Rule. Electronic copy of Rx will also be available via [employer]/EP[employer].     Type 2 diabetes mellitus without complication, without long-term current use of insulin  The patient has diabetes without any evidence of retinopathy.  The patient was advised to maintain tight glucose control, tight blood pressure control, and favorable levels of cholesterol, low density lipoprotein, and high density lipoproteins.  Follow up in one year was recommended.    Next visit get OCT RNFL- + FmHx of glaucoma in mother.

## 2025-06-18 ENCOUNTER — APPOINTMENT (OUTPATIENT)
Dept: NEUROLOGY | Facility: CLINIC | Age: 60
End: 2025-06-18
Payer: MEDICARE

## 2025-06-18 VITALS
RESPIRATION RATE: 16 BRPM | BODY MASS INDEX: 40.91 KG/M2 | SYSTOLIC BLOOD PRESSURE: 109 MMHG | HEART RATE: 91 BPM | WEIGHT: 277 LBS | DIASTOLIC BLOOD PRESSURE: 69 MMHG

## 2025-06-18 DIAGNOSIS — R42 DIZZINESS: ICD-10-CM

## 2025-06-18 DIAGNOSIS — G43.E09 CHRONIC MIGRAINE WITH AURA WITHOUT STATUS MIGRAINOSUS, NOT INTRACTABLE: Primary | ICD-10-CM

## 2025-06-18 PROCEDURE — 3078F DIAST BP <80 MM HG: CPT | Performed by: STUDENT IN AN ORGANIZED HEALTH CARE EDUCATION/TRAINING PROGRAM

## 2025-06-18 PROCEDURE — 99215 OFFICE O/P EST HI 40 MIN: CPT | Performed by: STUDENT IN AN ORGANIZED HEALTH CARE EDUCATION/TRAINING PROGRAM

## 2025-06-18 PROCEDURE — 3074F SYST BP LT 130 MM HG: CPT | Performed by: STUDENT IN AN ORGANIZED HEALTH CARE EDUCATION/TRAINING PROGRAM

## 2025-06-18 PROCEDURE — 3044F HG A1C LEVEL LT 7.0%: CPT | Performed by: STUDENT IN AN ORGANIZED HEALTH CARE EDUCATION/TRAINING PROGRAM

## 2025-06-18 RX ORDER — SERTRALINE HYDROCHLORIDE 50 MG/1
TABLET, FILM COATED ORAL
Qty: 30 TABLET | Refills: 11 | Status: SHIPPED | OUTPATIENT
Start: 2025-06-18 | End: 2026-07-02

## 2025-06-18 ASSESSMENT — ENCOUNTER SYMPTOMS
DEPRESSION: 0
LOSS OF SENSATION IN FEET: 1
OCCASIONAL FEELINGS OF UNSTEADINESS: 1

## 2025-06-18 ASSESSMENT — VISUAL ACUITY: VA_NORMAL: 1

## 2025-06-18 NOTE — PATIENT INSTRUCTIONS
Pleasure meeting you today.  We discussed the following:  -You either have chronic vestibular migraines or a condition called PPPD which is an anxiety spectrum disorder.  -We will start you on a new medication called Zoloft.  Will start at a low dose and work your way up to let your body get used to it (Side effects include but are not limited to: suicidality/suicidal thoughts, nausea, diarrhea, fatigue, insomnia. If these or any other side effects occur please let us know)  -Will also refer you for vestibular therapy to help with your balance and walking  - Follow-up in 6 months

## 2025-06-18 NOTE — PROGRESS NOTES
Subjective     Bisi Schwarz is a right handed  59 y.o. year old female who presents with evaluation of vertigo.  Visit type: follow up visit     HPI    Interval hx  Follow-up for vestibular migraine.  Last seen 6/5/2024 when she was started on amitriptyline.  She however developed constipation and HTN and had to discontinue medication.  Declined switching to Topamax because of SE profile. Patient reports persistent feeling of imbalance with nausea and dizziness. The dizziness feels like room spinning, has caused some falls and improved with meclizine. Also reports still having headaches at back of head 2x per week still throbbing in quality, 5-6/10 severity.    Prior Hx  She has episodes of dizziness since 2018,is like that room is moving and lasts 10 minutes and after that she has headache in the back of her head which lasts about one hour and usually takes tylenol which helps with headaches.the episodes happen 4 times per month.has nausea with episodes.and triggers with standing.light,noise and odors.Had headaches when she was young during periods.she also has headaches 3 time per month in the back and not related to dizziness and also gets nauseous.previously tried amitriptyline  and helped with SX.Stopped refill by pharmacy bcz she was no longer was visited here.  She never experienced any other neurologic Sx in the past.  Prior Hx by chart:Briefly, she noted vertiginous sensation dating back to Feb 2018 associated with some positional changes. She presented to [facility] at that time and was diagnosed with MRI negative stroke due to noted gaze evoked nystagmus. She was discharged on ASA and atorva 40.     She states she's been exercising 2x/week consisting of walking and lifting weights. Dizziness has resolved but she states she occasionally has some headache. She describes her HA as sometimes occipital, sometimes frontal and throbbing sensation. Lasts 2-3 hrs. It is sometimes proceeded by light flashing in  her peripheral vision. Associated with photophobia and when she gets the HA, she goes lie down in a dark room. No nausea or vomiting. Gets 3-4 HA/week. Tylenol helps for a while. She is taking ASA but did not tolerate atorvastatin. Her PCP is following her cholesterol regimen.      No prior history of kidney stones. Mood is adequate and denies depression.       Brain MRI on 2018:CSF Spaces: The ventricles, sulci and basal cisterns are unchanged.    Parenchyma: There is no diffusion restriction abnormality to suggest  acute infarct. There remain rare scattered punctate abnormal white  matter hyperintensities in the hemispheres on FLAIR and T2 images.  There is no mass effect or midline shift.    Contrast: There remains a dural-based uniformly enhancing rounded  lesion superficial to the left anterolateral frontal lobe. Allowing  for slight differences in interval slice position, and measuring in  similar locations the mass again measures approximately 20 mm in AP  oblique axis by 12 mm in oblique transverse by 11 mm in oblique  depth. There is no significant interval change or growth.  The enhancement pattern is otherwise within normal limits.    Paranasal Sinuses and Mastoids: Visualized paranasal sinuses and  mastoid air cells are unremarkable.    IMPRESSION:  Stable exam.  Left anterolateral frontal mass most consistent with meningioma  without significant interval change.  Interpreted within [place], [place]        Past Medical History     · History of bipolar disorder (V11.1) (Z86.59),HTN,DM Type 2,     Surgical History     · History of Biopsy Breast Open   · History of Total Abdominal Hysterectomy   · History of Varicose Vein Ligation     Family History     · Family history of pancreatic cancer in aunt(V16.0) (Z80.0)     · Family history of malignant neoplasm of breast in cousin (V16.3) (Z80.3)     Social History     ·     · Never smoker   · No alcohol use          10 point ROS  reviewed and is negative except as above              Review of Systems  All other system have been reviewed and are negative for complaint.  Objective   Neurological Exam  Mental Status   Oriented to person, place and time.    Cranial Nerves  CN II: Visual acuity is normal.  CN III, IV, VI: Extraocular movements intact bilaterally.  CN V: Facial sensation is normal.  CN VII: Full and symmetric facial movement.  CN VIII: Hearing is normal.  CN IX, X: Palate elevates symmetrically  CN XI: Shoulder shrug strength is normal.  CN XII: Tongue midline without atrophy or fasciculations.    Motor   Normal muscle tone. No abnormal involuntary movements. Strength is 5/5 throughout all four extremities.    Sensory  Light touch is normal in upper and lower extremities. Vibration is normal in upper and lower extremities.     Reflexes                                            Right                      Left  Biceps                                 1+                         1+  Patellar                                1+                         1+    Coordination  Right: Finger-to-nose normal.Left: Finger-to-nose normal.    Gait  Casual gait: Antalgic gait.  Pain in back and knees.      Physical Exam  Eyes:      Extraocular Movements: Extraocular movements intact.   Neurological:      Motor: Motor strength is normal.     Deep Tendon Reflexes:      Reflex Scores:       Bicep reflexes are 1+ on the right side and 1+ on the left side.       Patellar reflexes are 1+ on the right side and 1+ on the left side.      Assessment/Plan   Bisi Schwarz is a right handed  58 y.o. year old female who presents with evaluation of vertigo.She has episodes of dizziness since 2018,is like that room is moving and lasts 10 minutes and after that she has headache in the back of her head which lasts about one hour and usually takes tylenol which helps with headaches.the episodes happen 4 times per month.has nausea with episodes.and triggers with  standing.light,noise and odors.Had headaches when she was young during periods.she also has headaches 3 time per month in the back and not related to dizziness and also gets nauseous.previously tried amitriptyline  and helped with SX.Brain MRI on 2018 shows some punctate abnormal white matter hyperintensities in the hemispheres on FLAIR and T2 images. Clinical features are suggestive of vestibular Migraine. She had HTN and constipation on Amitriptyline.

## 2025-06-19 ENCOUNTER — TELEPHONE (OUTPATIENT)
Dept: PRIMARY CARE | Facility: CLINIC | Age: 60
End: 2025-06-19

## 2025-06-19 NOTE — TELEPHONE ENCOUNTER
Patient called to state that she saw her neurologist yesterday Dr. Cordova [phone] and he's sending patient to physical therapy to help with getting off of the cane. Any questions please reach out to Dr. Cordova.

## 2025-06-26 PROBLEM — D32.9 MENINGIOMA (MULTI): Status: RESOLVED | Noted: 2023-11-06 | Resolved: 2025-06-26

## 2025-06-27 DIAGNOSIS — E11.9 TYPE 2 DIABETES MELLITUS WITHOUT COMPLICATION, UNSPECIFIED WHETHER LONG TERM INSULIN USE: ICD-10-CM

## 2025-06-30 ENCOUNTER — HOSPITAL ENCOUNTER (OUTPATIENT)
Dept: RADIOLOGY | Facility: HOSPITAL | Age: 60
Discharge: HOME | End: 2025-06-30
Payer: MEDICARE

## 2025-06-30 DIAGNOSIS — Z12.31 ENCOUNTER FOR SCREENING MAMMOGRAM FOR MALIGNANT NEOPLASM OF BREAST: ICD-10-CM

## 2025-06-30 PROCEDURE — 77063 BREAST TOMOSYNTHESIS BI: CPT | Performed by: RADIOLOGY

## 2025-06-30 PROCEDURE — 77067 SCR MAMMO BI INCL CAD: CPT | Performed by: RADIOLOGY

## 2025-06-30 PROCEDURE — 77067 SCR MAMMO BI INCL CAD: CPT

## 2025-07-01 RX ORDER — BLOOD SUGAR DIAGNOSTIC
1 STRIP MISCELLANEOUS 3 TIMES DAILY
Qty: 100 STRIP | Refills: 2 | Status: SHIPPED | OUTPATIENT
Start: 2025-07-01

## 2025-07-15 DIAGNOSIS — D12.6 TUBULAR ADENOMA OF COLON: Primary | ICD-10-CM

## 2025-07-15 RX ORDER — POLYETHYLENE GLYCOL 3350, SODIUM SULFATE, SODIUM CHLORIDE, POTASSIUM CHLORIDE, SODIUM ASCORBATE, AND ASCORBIC ACID 7.5-2.691G
KIT ORAL
Qty: 1 EACH | Refills: 0 | Status: SHIPPED | OUTPATIENT
Start: 2025-07-15

## 2025-07-17 NOTE — PROGRESS NOTES
Physical Therapy    Physical Therapy Evaluation and Treatment      Patient Name: Bisi Schwarz  MRN: [id number]  Today's Date: 7/23/2025    Time Entry:   Time Calculation  Start Time: 1345  Stop Time: 1445  Time Calculation (min): 60 min  PT Evaluation Time Entry  PT Evaluation (Low) Time Entry: 30  PT Therapeutic Procedures Time Entry  Therapeutic Exercise Time Entry: 10  Canalith Repositioning Time Entry: 15                   Assessment:  PT Assessment  PT Assessment Results: Decreased range of motion, Impaired balance, Decreased mobility, Pain  Rehab Prognosis: Good   Patient presents with a diagnosis of dizziness with associated decreased ROM, strength, gait, balance, increased fall risk and decreased knowledge of how to manage symptoms independently, difficulty with community ambulation, functional tasks including and task with standing and walking and overall decreased quality of life.  The patient can benefit from skilled therapy interventions to address the patient's deficits and maximize the quality of her life.    Plan:  OP PT Plan  Treatment/Interventions: Canalith repositioning, Education/ Instruction, Manual therapy, Neuromuscular re-education, Therapeutic activities, Therapeutic exercises  PT Plan: Skilled PT  PT Frequency: 1 time per week  Duration: 24  Onset Date: 06/18/25  Certification Period Start Date: 07/23/25  Certification Period End Date: 10/21/25  Number of Treatments Authorized: Pending  Rehab Potential: Good  Plan of Care Agreement: Patient    Current Problem:   Problem List Items Addressed This Visit    None  Visit Diagnoses         Codes      Dizziness    -  Primary R42    Relevant Orders    Follow Up In Physical Therapy      Cervicalgia     M54.2    Relevant Orders    Follow Up In Physical Therapy      BPPV (benign paroxysmal positional vertigo), left     H81.12    Relevant Orders    Follow Up In Physical Therapy             Subjective    Dizziness December of 2024, getting out of  bed, fell forward to the couch.  Dizzy, nauseous, difficulty talking, ears, throat, head moving and spinning.    Dizziness currently 0/10, last week 0/10 last time dizzy was 2 months ago.  Imbalance: with standing and walking, last week 4-9/10.  Nausea:  with eating educated to talk to primary care physician  Ear Crackling: recommended to follow up with primary.    Headaches and lightheadedness- unknown cause.  Headaches at least one time a week.  Located at base of skull and front of head.  Severity of 5-6/10.  Associated with weather.    Neck pain currently 0/10, last week 0-8/10 associated with unknown cause.  Associated with weather changes    General:  General  Preferred Learning Style: auditory, kinesthetic, verbal, visual, written  PER AASEF SHAILH NOTE DATED 6/18/2025:  Bisi Schwarz is a right handed  59 y.o. year old female who presents with evaluation of vertigo.  Visit type: follow up visit      HPI     Interval hx  Follow-up for vestibular migraine.  Last seen 6/5/2024 when she was started on amitriptyline.  She however developed constipation and HTN and had to discontinue medication.  Declined switching to Topamax because of SE profile. Patient reports persistent feeling of imbalance with nausea and dizziness. The dizziness feels like room spinning, has caused some falls and improved with meclizine. Also reports still having headaches at back of head 2x per week still throbbing in quality, 5-6/10 severity.     Prior Hx  She has episodes of dizziness since 2018,is like that room is moving and lasts 10 minutes and after that she has headache in the back of her head which lasts about one hour and usually takes tylenol which helps with headaches.the episodes happen 4 times per month.has nausea with episodes.and triggers with standing.light,noise and odors.Had headaches when she was young during periods.she also has headaches 3 time per month in the back and not related to dizziness and also gets  nauseous.previously tried amitriptyline  and helped with SX.Stopped refill by pharmacy bcz she was no longer was visited here.  She never experienced any other neurologic Sx in the past.  Prior Hx by chart:Briefly, she noted vertiginous sensation dating back to Feb 2018 associated with some positional changes. She presented to [facility] at that time and was diagnosed with MRI negative stroke due to noted gaze evoked nystagmus. She was discharged on ASA and atorva 40.     She states she's been exercising 2x/week consisting of walking and lifting weights. Dizziness has resolved but she states she occasionally has some headache. She describes her HA as sometimes occipital, sometimes frontal and throbbing sensation. Lasts 2-3 hrs. It is sometimes proceeded by light flashing in her peripheral vision. Associated with photophobia and when she gets the HA, she goes lie down in a dark room. No nausea or vomiting. Gets 3-4 HA/week. Tylenol helps for a while. She is taking ASA but did not tolerate atorvastatin. Her PCP is following her cholesterol regimen.      No prior history of kidney stones. Mood is adequate and denies depression.         Brain MRI on 2018:CSF Spaces: The ventricles, sulci and basal cisterns are unchanged.     Parenchyma: There is no diffusion restriction abnormality to suggest  acute infarct. There remain rare scattered punctate abnormal white  matter hyperintensities in the hemispheres on FLAIR and T2 images.  There is no mass effect or midline shift.     Contrast: There remains a dural-based uniformly enhancing rounded  lesion superficial to the left anterolateral frontal lobe. Allowing  for slight differences in interval slice position, and measuring in  similar locations the mass again measures approximately 20 mm in AP  oblique axis by 12 mm in oblique transverse by 11 mm in oblique  depth. There is no significant interval change or growth.  The enhancement pattern is otherwise within normal limits.      Paranasal Sinuses and Mastoids: Visualized paranasal sinuses and  mastoid air cells are unremarkable.     IMPRESSION:  Stable exam.  Left anterolateral frontal mass most consistent with meningioma  without significant interval change.    Precautions:  Precautions  STEADI Fall Risk Score (The score of 4 or more indicates an increased risk of falling): 9    Vital Signs:     None taken this date    Pain:  Pain Assessment  Pain Assessment: 0-10  0-10 (Numeric) Pain Score:  (ankles knees hip back shoulders thumb and hands sometimes 5-6/10)    Home Living:     Apartment on 2nd floor laundry on 3rd floor.    Prior Level of Function:     Independent without dizziness.    Objective     CERVICAL ROM:    AROM   EXTENSION 45   FLEXION 50   RIGHT ROTATION 40   LEFT ROTATION 45   RIGHT LATERAL FLEXION 15   LEFT LATERAL FLEXION 25     CERVICAL SPECIAL TEST:  TRANSVERSE LIGAMENT  NEG   SHARP-ROSSANA NEG      VERTEBRAL ARTERY SUBJECTIVE REPORTING FOR THE FOLLOWING:     NEGATIVE POSITIVE   DYPLOPIA X    DYSARTHRIA X    DYSPHAGIA X    NYSTAGMUS X    DROP ATTACK X    NAUSEA X    FACIAL NUMBNESS X    DIZZINESS X      LOWER EXTREMITY STRENGTH:  Will be assessed in the future    OCULAR MOTOR EXAM:   NORMAL ABNORMAL   RANGE OF MOTION X    SMOOTH PURSUIT  X      HEAD THRUST:  Not tested    ALL OF THE BELOW WITH GOGGLES UNLESS OTHERWISE NOTED:  SPONTANEOUS    GAZE EVOKED Negative     POSITIONAL TESTING:  POSITION NYSTAGMUS DIRECTION DIZZINESS DURATION(SECONDS)   HEAD CENTER Nauseous 0/10    ROLL RIGHT  0/10    ROLL LEFT  6-7/10    BETI HALPIKE RIGHT  0/10    BETI HALPIKE LEFT LEFT lateral nystagmus with end TORSIONAL NYSTAGMUS 6/10      STATIC and DYNAMIC BALANCE:  Will be assessed in the future.    Posture:  Forward head, rounded shoulders and thoracic kyphosis.     Outcome Measures:  Other Measures  Dizziness Handicap Inventory: 68     Treatments:  CRM:  15 min  Epley for left posterior canal BPPV based on symptoms.    -Patient with  complaint of dizziness with return to sit.      Therapeutic exercises: 10 min    Precautions sleep propped up 20 degrees, sleep on left right side or back, avoid bending and head movement for the next 24 hours.    Patient education that the dizziness maybe related holding her breath with positional changes.    EDUCATION:  Outpatient Education  Individual(s) Educated: Patient  Education Provided: Home Exercise Program, POC  Risk and Benefits Discussed with Patient/Caregiver/Other: yes  Patient/Caregiver Demonstrated Understanding: yes  Plan of Care Discussed and Agreed Upon: yes  Patient Response to Education: Patient/Caregiver Verbalized Understanding of Information, Patient/Caregiver Performed Return Demonstration of Exercises/Activities, Patient/Caregiver Asked Appropriate Questions      Goals:  Active       PT Problem       PT short term Goal:       Start:  07/23/25    Expected End:  09/06/25       1.  HEP will be started  2.  Static balance will be assessed  3.  Dynamic balance will be assessed  4.  HEP will be started           PT Goal 2:  Patient will increase cervical rotation to 0-50 degrees to allow the patient to scan environment without pain.        Start:  07/23/25    Expected End:  01/19/26            PT Goal 3:  Patient decrease neck pain to 0-5/10 to allow increased ease in scan her environment.        Start:  07/23/25    Expected End:  01/19/26            PT Goal 4:  Patient will report 0-5/10 imbalance with walking to decrease risk of falling.        Start:  07/23/25    Expected End:  01/19/26            PT Goal 5:  Patient will be independent in Home Exercise Program to manage symptoms and maximize their functional independence.        Start:  07/23/25    Expected End:  01/19/26            Patient Stated Goal 1:  To walk without a cane and get better       Start:  07/23/25    Expected End:  01/19/26                Insurance Authorization Information  Date of Evaluation: 7/23/2025    Onset Date:  6/18/2025    Referring Physician: Shaikh,Aasef G     Surgery in the Last 3 months:  no    CPT Codes: ALLOWED CPT CODES: THEREX (96935), THERE-ACT (36442), NMR  (49025), MANUAL (18767), and Canalith repositioning (13840)    Diagnosis:   Problem List Items Addressed This Visit    None  Visit Diagnoses         Codes      Dizziness    -  Primary R42    Relevant Orders    Follow Up In Physical Therapy      Cervicalgia     M54.2    Relevant Orders    Follow Up In Physical Therapy      BPPV (benign paroxysmal positional vertigo), left     H81.12    Relevant Orders    Follow Up In Physical Therapy               Functional Outcome:  Other Measures  Dizziness Handicap Inventory: 68    OT / PT Evaluation complexity:  high    Which of the following best describes the primary reason of therapy: Improving, restoring, or adapting functional mobility or skills    Visits Requested: 20    Date Range: 90 days    Select all conditions that apply: Arthritis Conditions         Cami Alcazar, PT

## 2025-07-23 ENCOUNTER — CLINICAL SUPPORT (OUTPATIENT)
Dept: PHYSICAL THERAPY | Facility: CLINIC | Age: 60
End: 2025-07-23
Payer: MEDICARE

## 2025-07-23 DIAGNOSIS — M54.2 CERVICALGIA: ICD-10-CM

## 2025-07-23 DIAGNOSIS — R42 DIZZINESS: Primary | ICD-10-CM

## 2025-07-23 DIAGNOSIS — H81.12 BPPV (BENIGN PAROXYSMAL POSITIONAL VERTIGO), LEFT: ICD-10-CM

## 2025-07-23 PROCEDURE — 95992 CANALITH REPOSITIONING PROC: CPT | Mod: GP | Performed by: PHYSICAL THERAPIST

## 2025-07-23 PROCEDURE — 97110 THERAPEUTIC EXERCISES: CPT | Mod: GP | Performed by: PHYSICAL THERAPIST

## 2025-07-23 PROCEDURE — 97161 PT EVAL LOW COMPLEX 20 MIN: CPT | Mod: GP | Performed by: PHYSICAL THERAPIST

## 2025-07-23 ASSESSMENT — ENCOUNTER SYMPTOMS
LOSS OF SENSATION IN FEET: 1
OCCASIONAL FEELINGS OF UNSTEADINESS: 1
DEPRESSION: 0

## 2025-07-23 ASSESSMENT — PAIN - FUNCTIONAL ASSESSMENT: PAIN_FUNCTIONAL_ASSESSMENT: 0-10

## 2025-07-25 ENCOUNTER — APPOINTMENT (OUTPATIENT)
Dept: GASTROENTEROLOGY | Facility: HOSPITAL | Age: 60
End: 2025-07-25
Payer: MEDICARE

## 2025-07-27 NOTE — PROGRESS NOTES
Physical Therapy    Physical Therapy Treatment    Patient Name: Bisi Schwarz  MRN: [id number]  Today's Date:  7/30/2025     Time Entry:   Time Calculation  Start Time: 1100  Stop Time: 1200  Time Calculation (min): 60 min     PT Therapeutic Procedures Time Entry  Neuromuscular Re-Education Time Entry: 50  Canalith Repositioning Time Entry: 10                   Visit number:  2 Of 14  Insurance:  Authorized visits:  14  Authorization end date:  10/21/2025  Primary diagnosis:  Dizzy    Assessment:     Patient without dizziness or nystagmus in left semont, indicating that BPPV is cleared at this time.  Patient with orthostatic BP this date, which would explain dizziness with getting out of bed.  And dizziness with stair negotiation with descending the stairs was resolved this date with not looking through her bifocal.    Plan:     Continue to work on balance and gait.    Current Problem  Problem List Items Addressed This Visit    None  Visit Diagnoses         Codes      Dizziness    -  Primary R42      Cervicalgia     M54.2      BPPV (benign paroxysmal positional vertigo), left     H81.12           Subjective    Patient report dizziness with getting out of bed.    Precautions     Vital Signs     Sitting 143/71  Standing 111/66    Pain  Pain Assessment  Pain Assessment: 0-10  0-10 (Numeric) Pain Score:  (right knee achy)    Objective     Outcome Measures:  FGA - Functional Gait Assessment  Gait level surface: 2  Change in gait speed: 3  Gait with horizontal head turns: 2  Gait with vertical head turns: 3  Gait and pivot turn: 3  Step over obstacle: 1  Gait with narrow base of support: 0  Gait with eyes closed: 0  Ambulating backwards: 3  Steps: 1  FGA Total Score: 18  Treatments:    7/30/2025:  CRM  left Semont 10 min    Neuro:  Cup taps   FGA  Discussion and education on need to drink 64 oz of water a day.  Strategies to try to decrease -need to get up at night to go to the bathroom.  Education about orthostatic BP  and conservative management including hydration, and compression socks as well as changing position and the waiting for symptoms to subside before walking.    EVAL  CRM:  15 min  Epley for left posterior canal BPPV based on symptoms.     -Patient with complaint of dizziness with return to sit.       Therapeutic exercises: 10 min    Precautions sleep propped up 20 degrees, sleep on left right side or back, avoid bending and head movement for the next 24 hours.     Patient education that the dizziness maybe related holding her breath with positional changes.    OP EDUCATION:       Goals:  Active       PT Problem       PT short term Goal:       Start:  07/23/25    Expected End:  09/06/25       1.  HEP will be started  2.  Static balance will be assessed  3.  Dynamic balance will be assessed  4.  HEP will be started           PT Goal 2:  Patient will increase cervical rotation to 0-50 degrees to allow the patient to scan environment without pain.        Start:  07/23/25    Expected End:  01/19/26            PT Goal 3:  Patient decrease neck pain to 0-5/10 to allow increased ease in scan her environment.        Start:  07/23/25    Expected End:  01/19/26            PT Goal 4:  Patient will report 0-5/10 imbalance with walking to decrease risk of falling.        Start:  07/23/25    Expected End:  01/19/26            PT Goal 5:  Patient will be independent in Home Exercise Program to manage symptoms and maximize their functional independence.        Start:  07/23/25    Expected End:  01/19/26            Patient Stated Goal 1:  To walk without a cane and get better       Start:  07/23/25    Expected End:  01/19/26

## 2025-07-30 ENCOUNTER — TREATMENT (OUTPATIENT)
Dept: PHYSICAL THERAPY | Facility: CLINIC | Age: 60
End: 2025-07-30
Payer: MEDICARE

## 2025-07-30 DIAGNOSIS — M54.2 CERVICALGIA: ICD-10-CM

## 2025-07-30 DIAGNOSIS — H81.12 BPPV (BENIGN PAROXYSMAL POSITIONAL VERTIGO), LEFT: ICD-10-CM

## 2025-07-30 DIAGNOSIS — R42 DIZZINESS: Primary | ICD-10-CM

## 2025-07-30 PROCEDURE — 95992 CANALITH REPOSITIONING PROC: CPT | Mod: GP | Performed by: PHYSICAL THERAPIST

## 2025-07-30 PROCEDURE — 97112 NEUROMUSCULAR REEDUCATION: CPT | Mod: GP | Performed by: PHYSICAL THERAPIST

## 2025-07-30 ASSESSMENT — PAIN - FUNCTIONAL ASSESSMENT: PAIN_FUNCTIONAL_ASSESSMENT: 0-10

## 2025-08-01 ENCOUNTER — ANESTHESIA EVENT (OUTPATIENT)
Dept: GASTROENTEROLOGY | Facility: HOSPITAL | Age: 60
End: 2025-08-01
Payer: MEDICARE

## 2025-08-01 ENCOUNTER — HOSPITAL ENCOUNTER (OUTPATIENT)
Dept: GASTROENTEROLOGY | Facility: HOSPITAL | Age: 60
Discharge: HOME | End: 2025-08-01
Payer: MEDICARE

## 2025-08-01 ENCOUNTER — ANESTHESIA (OUTPATIENT)
Dept: GASTROENTEROLOGY | Facility: HOSPITAL | Age: 60
End: 2025-08-01
Payer: MEDICARE

## 2025-08-01 VITALS
WEIGHT: 282 LBS | SYSTOLIC BLOOD PRESSURE: 116 MMHG | DIASTOLIC BLOOD PRESSURE: 70 MMHG | BODY MASS INDEX: 41.77 KG/M2 | HEIGHT: 69 IN | TEMPERATURE: 97.3 F | OXYGEN SATURATION: 99 % | HEART RATE: 52 BPM | RESPIRATION RATE: 16 BRPM

## 2025-08-01 DIAGNOSIS — D12.2 BENIGN NEOPLASM OF ASCENDING COLON: ICD-10-CM

## 2025-08-01 LAB — GLUCOSE BLD MANUAL STRIP-MCNC: 100 MG/DL (ref 74–99)

## 2025-08-01 PROCEDURE — 45380 COLONOSCOPY AND BIOPSY: CPT | Performed by: INTERNAL MEDICINE

## 2025-08-01 PROCEDURE — 2500000004 HC RX 250 GENERAL PHARMACY W/ HCPCS (ALT 636 FOR OP/ED): Mod: JW

## 2025-08-01 PROCEDURE — 7100000010 HC PHASE TWO TIME - EACH INCREMENTAL 1 MINUTE

## 2025-08-01 PROCEDURE — 82947 ASSAY GLUCOSE BLOOD QUANT: CPT

## 2025-08-01 PROCEDURE — 3700000002 HC GENERAL ANESTHESIA TIME - EACH INCREMENTAL 1 MINUTE

## 2025-08-01 PROCEDURE — 7100000009 HC PHASE TWO TIME - INITIAL BASE CHARGE

## 2025-08-01 PROCEDURE — 45385 COLONOSCOPY W/LESION REMOVAL: CPT | Performed by: INTERNAL MEDICINE

## 2025-08-01 PROCEDURE — 3700000001 HC GENERAL ANESTHESIA TIME - INITIAL BASE CHARGE

## 2025-08-01 RX ORDER — PROPOFOL 10 MG/ML
INJECTION, EMULSION INTRAVENOUS AS NEEDED
Status: DISCONTINUED | OUTPATIENT
Start: 2025-08-01 | End: 2025-08-01

## 2025-08-01 RX ORDER — PHENYLEPHRINE HCL IN 0.9% NACL 0.4MG/10ML
SYRINGE (ML) INTRAVENOUS AS NEEDED
Status: DISCONTINUED | OUTPATIENT
Start: 2025-08-01 | End: 2025-08-01

## 2025-08-01 RX ORDER — MIDAZOLAM HYDROCHLORIDE 2 MG/2ML
INJECTION, SOLUTION INTRAMUSCULAR; INTRAVENOUS AS NEEDED
Status: DISCONTINUED | OUTPATIENT
Start: 2025-08-01 | End: 2025-08-01

## 2025-08-01 RX ORDER — LIDOCAINE HCL/PF 100 MG/5ML
SYRINGE (ML) INTRAVENOUS AS NEEDED
Status: DISCONTINUED | OUTPATIENT
Start: 2025-08-01 | End: 2025-08-01

## 2025-08-01 RX ADMIN — PROPOFOL 50 MG: 10 INJECTION, EMULSION INTRAVENOUS at 10:28

## 2025-08-01 RX ADMIN — PROPOFOL 100 MCG/KG/MIN: 10 INJECTION, EMULSION INTRAVENOUS at 10:28

## 2025-08-01 RX ADMIN — SODIUM CHLORIDE, SODIUM LACTATE, POTASSIUM CHLORIDE, AND CALCIUM CHLORIDE: 600; 310; 30; 20 INJECTION, SOLUTION INTRAVENOUS at 10:28

## 2025-08-01 RX ADMIN — Medication 80 MCG: at 11:03

## 2025-08-01 RX ADMIN — LIDOCAINE HYDROCHLORIDE 50 MG: 20 INJECTION INTRAVENOUS at 10:28

## 2025-08-01 RX ADMIN — MIDAZOLAM HYDROCHLORIDE 2 MG: 2 INJECTION, SOLUTION INTRAMUSCULAR; INTRAVENOUS at 10:23

## 2025-08-01 SDOH — HEALTH STABILITY: MENTAL HEALTH: CURRENT SMOKER: 0

## 2025-08-01 ASSESSMENT — PAIN - FUNCTIONAL ASSESSMENT
PAIN_FUNCTIONAL_ASSESSMENT: 0-10

## 2025-08-01 ASSESSMENT — PAIN SCALES - GENERAL
PAINLEVEL_OUTOF10: 0 - NO PAIN
PAINLEVEL_OUTOF10: 0 - NO PAIN
PAIN_LEVEL: 4
PAINLEVEL_OUTOF10: 0 - NO PAIN

## 2025-08-01 NOTE — H&P
"History Of Present Illness  Bisi Schwarz is a 59 y.o. female presenting for a surveillance colonoscopy      Past Medical History  Medical History[1]  Surgical History  Surgical History[2]  Social History  She reports that she has never smoked. She has never used smokeless tobacco. She reports that she does not currently use alcohol. She reports that she does not use drugs.    Family History  Family History[3]     Allergies  Allergies[4]  Review of Systems     Physical Exam   General: NAD, lying in bed  Skin: warm and dry   Cardiac: No cyanosis, no tachycardia   Pulm: No respiratory distress, no accessory muscle use   GI: No abdominal distension   Extremities: no gross LE edema   Neuro: A/Ox3  Psych: appropriate mood and behavior        Last Recorded Vitals  Blood pressure 111/60, pulse 68, temperature 36 °C (96.8 °F), temperature source Temporal, resp. rate 16, height 1.753 m (5' 9\"), weight 128 kg (282 lb), SpO2 96%.    Assessment/Plan   Bisi Schwarz is a 59 y.o. female presenting for a surveillance colonoscopy      Richar Woods MD       [1]   Past Medical History:  Diagnosis Date    Asthma     Depression     Diabetes type 2     Personal history of other mental and behavioral disorders     History of bipolar disorder    Seizure (Multi)    [2]   Past Surgical History:  Procedure Laterality Date    BREAST BIOPSY  03/18/2014    Biopsy Breast Open    TOTAL ABDOMINAL HYSTERECTOMY  03/18/2014    Total Abdominal Hysterectomy    VARICOSE VEIN SURGERY  09/29/2014    Varicose Vein Ligation   [3]   Family History  Problem Relation Name Age of Onset    Glaucoma Mother      Breast cancer Mother's Sister  50 - 59    Breast cancer Father's Sister     [4]   Allergies  Allergen Reactions    Penicillins Dizziness and Hives     States \"falls out\"    Other reaction(s): Dizziness, Mental Status Change   States \"falls out\"    Pork Derived (Porcine) Headache    Pork/Porcine Containing Products Unknown     Other Reaction(s): " Intolerance    Sulfur Hives    Codeine Diarrhea, Hives and Rash    Naproxen Rash and Nausea/vomiting    Sulfa (Sulfonamide Antibiotics) Diarrhea, Rash, Hives and Dizziness

## 2025-08-01 NOTE — ANESTHESIA PREPROCEDURE EVALUATION
Patient: Bisi Schwarz    Procedure Information       Date/Time: 08/01/25 1020    Scheduled providers: Richar Woods MD; Michelle Bird RN    Procedure: COLONOSCOPY    Location: [facility]            Relevant Problems   Cardiac   (+) Hyperlipidemia   (+) Hypertension      Pulmonary   (+) Asthma      Neuro   (+) Bipolar disorder, current episode manic without psychotic features   (+) Schizophrenia, unspecified      Endocrine   (+) Hypothyroidism   (+) Obesity, morbid (Multi)   (+) Type 2 diabetes mellitus without complications      ID   (+) HSV-1 (herpes simplex virus 1) infection       Clinical information reviewed:   Tobacco  Allergies  Meds   Med Hx  Surg Hx   Fam Hx  Soc Hx        NPO Detail:  NPO/Void Status  Date of Last Liquid: 08/01/25  Time of Last Liquid: 0440  Date of Last Solid: 07/29/25         Physical Exam    Airway  Mallampati: I  TM distance: >3 FB  Neck ROM: full     Cardiovascular - normal exam   Dental - normal exam     Pulmonary - normal exam   Abdominal - normal exam           Anesthesia Plan    History of general anesthesia?: yes  History of complications of general anesthesia?: no    ASA 3     MAC     The patient is not a current smoker.  Patient was not previously instructed to abstain from smoking on day of procedure.  Patient did not smoke on day of procedure.    intravenous induction   Postoperative pain plan includes opioids.  Anesthetic plan and risks discussed with patient.  Use of blood products discussed with patient who.    Plan discussed with CAA.

## 2025-08-01 NOTE — DISCHARGE INSTRUCTIONS

## 2025-08-01 NOTE — ANESTHESIA POSTPROCEDURE EVALUATION
Patient: Bisi Schwarz    Procedure Summary       Date: 08/01/25 Room / Location: [facility]    Anesthesia Start: 1024 Anesthesia Stop: 1114    Procedure: COLONOSCOPY Diagnosis: Benign neoplasm of ascending colon    Scheduled Providers: Richar Woods MD; Michelle Bird RN Responsible Provider: Live Kruger MD    Anesthesia Type: MAC ASA Status: 3            Anesthesia Type: MAC    Vitals Value Taken Time   /70 08/01/25 11:42   Temp 36.3 °C (97.3 °F) 08/01/25 11:11   Pulse 52 08/01/25 11:42   Resp 16 08/01/25 11:42   SpO2 99 % 08/01/25 11:42       Anesthesia Post Evaluation    Patient location during evaluation: PACU  Patient participation: complete - patient participated  Level of consciousness: awake and alert  Pain score: 4  Pain management: adequate  Airway patency: patent  Cardiovascular status: acceptable  Respiratory status: acceptable  Hydration status: acceptable  Postoperative Nausea and Vomiting: none        No notable events documented.

## 2025-08-03 ASSESSMENT — PAIN SCALES - GENERAL: PAINLEVEL_OUTOF10: 0 - NO PAIN

## 2025-08-03 NOTE — PROGRESS NOTES
"Physical Therapy    Physical Therapy Treatment    Patient Name: Bisi Schwarz  MRN: [id number]  Today's Date:  8/4/2025     Time Entry:   Time Calculation  Start Time: 1400  Stop Time: 1500  Time Calculation (min): 60 min     PT Therapeutic Procedures Time Entry  Neuromuscular Re-Education Time Entry: 55                   Visit number:  3 Of 14  Insurance:  [employer]  Authorized visits:  14  Authorization end date:  10/21/2025  Primary diagnosis:  Dizzy    Assessment:     Patient with good performance of activities with stair negotiation, ambulation with head turns and walking outside this date without signs of imbalance.  Patient with complaints of being scared to do the activities.    Plan:     Continue to work on balance and gait.    Current Problem  Problem List Items Addressed This Visit    None  Visit Diagnoses         Codes      Dizziness    -  Primary R42      Cervicalgia     M54.2      BPPV (benign paroxysmal positional vertigo), left     H81.12             Subjective    Patient report that she slept all weekend after her coloscopy.    Precautions  Precautions  STEADI Fall Risk Score (The score of 4 or more indicates an increased risk of falling): 9    Vital Signs     Sitting 143/71  Standing 111/66    Pain  Pain Assessment  Pain Assessment: 0-10  0-10 (Numeric) Pain Score: 5 - Moderate pain (legs have been going straight and stiff)    Objective     Outcome Measures:     Treatments:  8/4/2025:  Measured for compression socks 19\" calves    Neuro re ed 55  Ambulation outside with cane on curb, side walk and inclines and declines.  Gait correction to work on pretending to step over 2X4 with improved foot clearance and report of decreased pain.  Ambulation without cane working on increase speed and increase step height with report of decreased pain and increased foot clearance.    Stair negotiation up and down 4\" steps without issues with and without cane. To improve confidence in balance and decreased " reliance on cane.    7/30/2025:  CRM  left Semont 10 min    Neuro:  Cup taps   FGA  Discussion and education on need to drink 64 oz of water a day.  Strategies to try to decrease -need to get up at night to go to the bathroom.  Education about orthostatic BP and conservative management including hydration, and compression socks as well as changing position and the waiting for symptoms to subside before walking.    EVAL  CRM:  15 min  Epley for left posterior canal BPPV based on symptoms.     -Patient with complaint of dizziness with return to sit.       Therapeutic exercises: 10 min    Precautions sleep propped up 20 degrees, sleep on left right side or back, avoid bending and head movement for the next 24 hours.     Patient education that the dizziness maybe related holding her breath with positional changes.    OP EDUCATION:       Goals:  Active       PT Problem       PT short term Goal:       Start:  07/23/25    Expected End:  09/06/25       1.  HEP will be started  2.  Static balance will be assessed  3.  Dynamic balance will be assessed  4.  HEP will be started           PT Goal 2:  Patient will increase cervical rotation to 0-50 degrees to allow the patient to scan environment without pain.        Start:  07/23/25    Expected End:  01/19/26            PT Goal 3:  Patient decrease neck pain to 0-5/10 to allow increased ease in scan her environment.        Start:  07/23/25    Expected End:  01/19/26            PT Goal 4:  Patient will report 0-5/10 imbalance with walking to decrease risk of falling.        Start:  07/23/25    Expected End:  01/19/26            PT Goal 5:  Patient will be independent in Home Exercise Program to manage symptoms and maximize their functional independence.        Start:  07/23/25    Expected End:  01/19/26            Patient Stated Goal 1:  To walk without a cane and get better       Start:  07/23/25    Expected End:  01/19/26

## 2025-08-04 ENCOUNTER — TREATMENT (OUTPATIENT)
Dept: PHYSICAL THERAPY | Facility: CLINIC | Age: 60
End: 2025-08-04
Payer: MEDICARE

## 2025-08-04 DIAGNOSIS — H81.12 BPPV (BENIGN PAROXYSMAL POSITIONAL VERTIGO), LEFT: ICD-10-CM

## 2025-08-04 DIAGNOSIS — M54.2 CERVICALGIA: ICD-10-CM

## 2025-08-04 DIAGNOSIS — R42 DIZZINESS: Primary | ICD-10-CM

## 2025-08-04 PROCEDURE — 97112 NEUROMUSCULAR REEDUCATION: CPT | Mod: GP | Performed by: PHYSICAL THERAPIST

## 2025-08-04 ASSESSMENT — PAIN SCALES - GENERAL: PAINLEVEL_OUTOF10: 5 - MODERATE PAIN

## 2025-08-04 ASSESSMENT — PAIN - FUNCTIONAL ASSESSMENT: PAIN_FUNCTIONAL_ASSESSMENT: 0-10

## 2025-08-06 ENCOUNTER — APPOINTMENT (OUTPATIENT)
Dept: PHYSICAL THERAPY | Facility: CLINIC | Age: 60
End: 2025-08-06
Payer: MEDICARE

## 2025-08-06 ENCOUNTER — TELEPHONE (OUTPATIENT)
Dept: NEUROLOGY | Facility: CLINIC | Age: 60
End: 2025-08-06
Payer: MEDICARE

## 2025-08-06 NOTE — TELEPHONE ENCOUNTER
----- Message from Sofie Maravilla sent at 8/6/2025  2:40 PM EDT -----  Yes, thanks  ----- Message -----  From: Roxana Gomes  Sent: 8/6/2025   1:11 PM EDT  To: Sofie Maravilla MD    Patient asking if she can take zoloft at night causing her to be sleeping when she takes during day

## 2025-08-08 ENCOUNTER — RESULTS FOLLOW-UP (OUTPATIENT)
Dept: GASTROENTEROLOGY | Facility: HOSPITAL | Age: 60
End: 2025-08-08
Payer: MEDICARE

## 2025-08-08 LAB
LABORATORY COMMENT REPORT: NORMAL
PATH REPORT.FINAL DX SPEC: NORMAL
PATH REPORT.GROSS SPEC: NORMAL
PATH REPORT.RELEVANT HX SPEC: NORMAL
PATH REPORT.TOTAL CANCER: NORMAL

## 2025-08-15 ENCOUNTER — TREATMENT (OUTPATIENT)
Dept: PHYSICAL THERAPY | Facility: CLINIC | Age: 60
End: 2025-08-15
Payer: MEDICARE

## 2025-08-15 DIAGNOSIS — M54.2 CERVICALGIA: ICD-10-CM

## 2025-08-15 DIAGNOSIS — H81.12 BPPV (BENIGN PAROXYSMAL POSITIONAL VERTIGO), LEFT: ICD-10-CM

## 2025-08-15 DIAGNOSIS — R42 DIZZINESS: ICD-10-CM

## 2025-08-15 PROCEDURE — 97110 THERAPEUTIC EXERCISES: CPT | Mod: GP | Performed by: PHYSICAL THERAPIST

## 2025-08-15 PROCEDURE — 97116 GAIT TRAINING THERAPY: CPT | Mod: GP | Performed by: PHYSICAL THERAPIST

## 2025-08-15 PROCEDURE — 97530 THERAPEUTIC ACTIVITIES: CPT | Mod: GP | Performed by: PHYSICAL THERAPIST

## 2025-08-22 ENCOUNTER — TREATMENT (OUTPATIENT)
Dept: PHYSICAL THERAPY | Facility: CLINIC | Age: 60
End: 2025-08-22
Payer: MEDICARE

## 2025-08-22 DIAGNOSIS — R42 DIZZINESS: ICD-10-CM

## 2025-08-22 DIAGNOSIS — M54.2 CERVICALGIA: ICD-10-CM

## 2025-08-22 DIAGNOSIS — H81.12 BPPV (BENIGN PAROXYSMAL POSITIONAL VERTIGO), LEFT: ICD-10-CM

## 2025-08-22 PROCEDURE — 97110 THERAPEUTIC EXERCISES: CPT | Mod: GP | Performed by: PHYSICAL THERAPIST

## 2025-08-29 ENCOUNTER — TREATMENT (OUTPATIENT)
Dept: PHYSICAL THERAPY | Facility: CLINIC | Age: 60
End: 2025-08-29
Payer: MEDICARE

## 2025-08-29 DIAGNOSIS — H81.12 BPPV (BENIGN PAROXYSMAL POSITIONAL VERTIGO), LEFT: ICD-10-CM

## 2025-08-29 DIAGNOSIS — R42 DIZZINESS: ICD-10-CM

## 2025-08-29 DIAGNOSIS — M54.2 CERVICALGIA: ICD-10-CM

## 2025-08-29 PROCEDURE — 97112 NEUROMUSCULAR REEDUCATION: CPT | Mod: GP | Performed by: PHYSICAL THERAPIST

## 2025-08-29 PROCEDURE — 97116 GAIT TRAINING THERAPY: CPT | Mod: GP | Performed by: PHYSICAL THERAPIST

## 2025-09-05 ENCOUNTER — TREATMENT (OUTPATIENT)
Dept: PHYSICAL THERAPY | Facility: CLINIC | Age: 60
End: 2025-09-05
Payer: MEDICARE

## 2025-09-05 DIAGNOSIS — H81.12 BPPV (BENIGN PAROXYSMAL POSITIONAL VERTIGO), LEFT: ICD-10-CM

## 2025-09-05 DIAGNOSIS — M54.2 CERVICALGIA: ICD-10-CM

## 2025-09-05 DIAGNOSIS — R42 DIZZINESS: Primary | ICD-10-CM

## 2025-09-05 PROCEDURE — 97530 THERAPEUTIC ACTIVITIES: CPT | Mod: GP | Performed by: PHYSICAL THERAPIST

## 2025-09-05 PROCEDURE — 97116 GAIT TRAINING THERAPY: CPT | Mod: GP | Performed by: PHYSICAL THERAPIST

## 2025-09-12 ENCOUNTER — APPOINTMENT (OUTPATIENT)
Facility: HOSPITAL | Age: 60
End: 2025-09-12
Payer: MEDICARE

## 2025-09-16 ENCOUNTER — APPOINTMENT (OUTPATIENT)
Facility: HOSPITAL | Age: 60
End: 2025-09-16
Payer: MEDICARE

## 2026-01-21 ENCOUNTER — APPOINTMENT (OUTPATIENT)
Dept: NEUROLOGY | Facility: CLINIC | Age: 61
End: 2026-01-21
Payer: MEDICARE

## 2026-04-28 ENCOUNTER — APPOINTMENT (OUTPATIENT)
Dept: OPHTHALMOLOGY | Facility: CLINIC | Age: 61
End: 2026-04-28
Payer: MEDICARE